# Patient Record
Sex: MALE | Race: WHITE | Employment: OTHER | URBAN - NONMETROPOLITAN AREA
[De-identification: names, ages, dates, MRNs, and addresses within clinical notes are randomized per-mention and may not be internally consistent; named-entity substitution may affect disease eponyms.]

---

## 2018-02-13 ENCOUNTER — DOCUMENTATION ONLY (OUTPATIENT)
Dept: FAMILY MEDICINE | Facility: OTHER | Age: 58
End: 2018-02-13

## 2019-07-22 ENCOUNTER — OFFICE VISIT (OUTPATIENT)
Dept: FAMILY MEDICINE | Facility: OTHER | Age: 59
End: 2019-07-22
Attending: FAMILY MEDICINE
Payer: COMMERCIAL

## 2019-07-22 VITALS
TEMPERATURE: 97.4 F | HEIGHT: 71 IN | DIASTOLIC BLOOD PRESSURE: 56 MMHG | SYSTOLIC BLOOD PRESSURE: 114 MMHG | RESPIRATION RATE: 20 BRPM | HEART RATE: 56 BPM | WEIGHT: 173 LBS | BODY MASS INDEX: 24.22 KG/M2

## 2019-07-22 DIAGNOSIS — R35.0 BENIGN PROSTATIC HYPERPLASIA WITH URINARY FREQUENCY: ICD-10-CM

## 2019-07-22 DIAGNOSIS — Z00.00 HEALTH CARE MAINTENANCE: Primary | ICD-10-CM

## 2019-07-22 DIAGNOSIS — N40.1 BENIGN PROSTATIC HYPERPLASIA WITH URINARY FREQUENCY: ICD-10-CM

## 2019-07-22 DIAGNOSIS — E78.00 HYPERCHOLESTEREMIA: ICD-10-CM

## 2019-07-22 DIAGNOSIS — Z98.890 H/O COLONOSCOPY: ICD-10-CM

## 2019-07-22 LAB
ALBUMIN SERPL-MCNC: 4.1 G/DL (ref 3.5–5.7)
ALP SERPL-CCNC: 73 U/L (ref 34–104)
ALT SERPL W P-5'-P-CCNC: 17 U/L (ref 7–52)
ANION GAP SERPL CALCULATED.3IONS-SCNC: 4 MMOL/L (ref 3–14)
AST SERPL W P-5'-P-CCNC: 15 U/L (ref 13–39)
BILIRUB SERPL-MCNC: 0.4 MG/DL (ref 0.3–1)
BUN SERPL-MCNC: 12 MG/DL (ref 7–25)
CALCIUM SERPL-MCNC: 9.4 MG/DL (ref 8.6–10.3)
CHLORIDE SERPL-SCNC: 104 MMOL/L (ref 98–107)
CHOLEST SERPL-MCNC: 213 MG/DL
CO2 SERPL-SCNC: 27 MMOL/L (ref 21–31)
CREAT SERPL-MCNC: 0.85 MG/DL (ref 0.7–1.3)
GFR SERPL CREATININE-BSD FRML MDRD: >90 ML/MIN/{1.73_M2}
GLUCOSE SERPL-MCNC: 107 MG/DL (ref 70–105)
HDLC SERPL-MCNC: 30 MG/DL (ref 23–92)
LDLC SERPL CALC-MCNC: 144 MG/DL
NONHDLC SERPL-MCNC: 183 MG/DL
POTASSIUM SERPL-SCNC: 4.2 MMOL/L (ref 3.5–5.1)
PROT SERPL-MCNC: 7.2 G/DL (ref 6.4–8.9)
PSA SERPL-ACNC: 0.83 NG/ML
SODIUM SERPL-SCNC: 135 MMOL/L (ref 134–144)
TRIGL SERPL-MCNC: 196 MG/DL

## 2019-07-22 PROCEDURE — 99396 PREV VISIT EST AGE 40-64: CPT | Performed by: FAMILY MEDICINE

## 2019-07-22 PROCEDURE — G0103 PSA SCREENING: HCPCS | Mod: ZL | Performed by: FAMILY MEDICINE

## 2019-07-22 PROCEDURE — 84153 ASSAY OF PSA TOTAL: CPT | Performed by: FAMILY MEDICINE

## 2019-07-22 PROCEDURE — 80061 LIPID PANEL: CPT | Mod: ZL | Performed by: FAMILY MEDICINE

## 2019-07-22 PROCEDURE — 80053 COMPREHEN METABOLIC PANEL: CPT | Mod: ZL | Performed by: FAMILY MEDICINE

## 2019-07-22 PROCEDURE — 36415 COLL VENOUS BLD VENIPUNCTURE: CPT | Mod: ZL | Performed by: FAMILY MEDICINE

## 2019-07-22 ASSESSMENT — ANXIETY QUESTIONNAIRES
6. BECOMING EASILY ANNOYED OR IRRITABLE: NOT AT ALL
3. WORRYING TOO MUCH ABOUT DIFFERENT THINGS: NOT AT ALL
1. FEELING NERVOUS, ANXIOUS, OR ON EDGE: NOT AT ALL
2. NOT BEING ABLE TO STOP OR CONTROL WORRYING: NOT AT ALL
7. FEELING AFRAID AS IF SOMETHING AWFUL MIGHT HAPPEN: NOT AT ALL
5. BEING SO RESTLESS THAT IT IS HARD TO SIT STILL: NOT AT ALL
GAD7 TOTAL SCORE: 0

## 2019-07-22 ASSESSMENT — PATIENT HEALTH QUESTIONNAIRE - PHQ9
5. POOR APPETITE OR OVEREATING: NOT AT ALL
SUM OF ALL RESPONSES TO PHQ QUESTIONS 1-9: 0

## 2019-07-22 ASSESSMENT — PAIN SCALES - GENERAL: PAINLEVEL: NO PAIN (0)

## 2019-07-22 ASSESSMENT — MIFFLIN-ST. JEOR: SCORE: 1621.85

## 2019-07-22 NOTE — LETTER
July 24, 2019      Howard Chow  [address] [facility]  [facility] [address]        Dear ,    We are writing to inform you of your test results.    The 10-year ASCVD risk score (Rafael ALVA JrMiguel A, et al., 2013) is: 10.1%    Values used to calculate the score:      Age: 58 years      Sex: Male      Is Non- : No      Diabetic: No      Tobacco smoker: No      Systolic Blood Pressure: 114 mmHg      Is BP treated: No      HDL Cholesterol: 30 mg/dL      Total Cholesterol: 213 mg/dL  As you can see your cholesterol is elevated and your coronary vascular risk score was 10% which indicates she will have a 10% chance of a having a heart attack in 10 years.  The current recommendation is anything above 7.5% we start her on a cholesterol-lowering medication.  I have included one for you to start.    Resulted Orders   Lipid Panel   Result Value Ref Range    Cholesterol 213 (H) <200 mg/dL    Triglycerides 196 (H) <150 mg/dL      Comment:      Borderline high:  150-199 mg/dl  High:             200-499 mg/dl  Very high:       >499 mg/dl      HDL Cholesterol 30 23 - 92 mg/dL    LDL Cholesterol Calculated 144 (H) <100 mg/dL      Comment:      Above desirable:  100-129 mg/dl  Borderline High:  130-159 mg/dL  High:             160-189 mg/dL  Very high:       >189 mg/dl      Non HDL Cholesterol 183 (H) <130 mg/dL      Comment:      Above Desirable:  130-159 mg/dl  Borderline high:  160-189 mg/dl  High:             190-219 mg/dl  Very high:       >219 mg/dl     PSA Screen GH   Result Value Ref Range    PSA Screen 0.830 <3.100 ng/mL      Comment:      The DXI Access PSAS WHO assay is a two site immunoenzymatic assay. Assay   values obtained with different assay methods cannot be used interchangeably   due to differences in assay methods and reagent specificity.     Comprehensive Metabolic Panel   Result Value Ref Range    Sodium 135 134 - 144 mmol/L    Potassium 4.2 3.5 - 5.1 mmol/L    Chloride 104 98 -  107 mmol/L    Carbon Dioxide 27 21 - 31 mmol/L    Anion Gap 4 3 - 14 mmol/L    Glucose 107 (H) 70 - 105 mg/dL    Urea Nitrogen 12 7 - 25 mg/dL    Creatinine 0.85 0.70 - 1.30 mg/dL    GFR Estimate >90 >60 mL/min/[1.73_m2]    GFR Estimate If Black >90 >60 mL/min/[1.73_m2]    Calcium 9.4 8.6 - 10.3 mg/dL    Bilirubin Total 0.4 0.3 - 1.0 mg/dL    Albumin 4.1 3.5 - 5.7 g/dL    Protein Total 7.2 6.4 - 8.9 g/dL    Alkaline Phosphatase 73 34 - 104 U/L    ALT 17 7 - 52 U/L    AST 15 13 - 39 U/L       If you have any questions or concerns, please call the clinic at the number listed above.       Sincerely,        Howard Roberts MD

## 2019-07-22 NOTE — NURSING NOTE
Patient here for yearly physical, no concerns today. Last eye exam 2018 and last dental exam June 2019. Medication Reconciliation: complete.    Leah Bejarano LPN  7/22/2019 9:11 AM

## 2019-07-23 ASSESSMENT — ANXIETY QUESTIONNAIRES: GAD7 TOTAL SCORE: 0

## 2019-07-24 RX ORDER — ATORVASTATIN CALCIUM 20 MG/1
20 TABLET, FILM COATED ORAL DAILY
Qty: 90 TABLET | Refills: 3 | Status: SHIPPED | OUTPATIENT
Start: 2019-07-24 | End: 2020-04-15

## 2019-07-24 RX ORDER — ATORVASTATIN CALCIUM 20 MG/1
20 TABLET, FILM COATED ORAL DAILY
Qty: 90 TABLET | Refills: 3 | Status: SHIPPED | OUTPATIENT
Start: 2019-07-24 | End: 2019-07-24

## 2019-07-24 NOTE — PROGRESS NOTES
"  SUBJECTIVE:   Howard Chow is a 58 year old male who presents to clinic today for the following health issues: Physical patient arrives here for physical.  He currently has no specific concerns or complaints.    Review of the chart shows he is in need of a shingles vaccine which was discussed with him        Patient Active Problem List    Diagnosis Date Noted     H/O colonoscopy nl 2017 07/22/2019     Priority: Medium     Benign prostatic hyperplasia with urinary frequency 07/22/2019     Priority: Medium     Benign non-nodular prostatic hyperplasia with lower urinary tract symptoms 09/19/2016     Priority: Medium     Diverticulosis of colon 02/23/2015     Priority: Medium     Health care maintenance 02/18/2015     Priority: Medium     Past Medical History:   Diagnosis Date     Other chest pain     Hx of chest wall pain      Past Surgical History:   Procedure Laterality Date     ARTHROSCOPY SHOULDER      bilat     COLONOSCOPY  02/23/2015    Normal, F/U 2025     OTHER SURGICAL HISTORY      2/2007,,HERNIA REPAIR,Left,Repair of left inguinal hernia with mesh carried out February 2007     Family History   Problem Relation Age of Onset     Genetic Disorder Other         Genetic,None noted     No Known Allergies    Review of Systems     OBJECTIVE:     /56   Pulse 56   Temp 97.4  F (36.3  C)   Resp 20   Ht 1.803 m (5' 11\")   Wt 78.5 kg (173 lb)   BMI 24.13 kg/m    Body mass index is 24.13 kg/m .  Physical Exam   Constitutional: He appears well-developed and well-nourished.   HENT:   Right Ear: External ear normal.   Left Ear: External ear normal.   Mouth/Throat: Oropharynx is clear and moist.   Eyes: Pupils are equal, round, and reactive to light. Conjunctivae are normal.   Cardiovascular: Normal rate and regular rhythm.   Pulmonary/Chest: Effort normal and breath sounds normal.   Abdominal: Soft. Bowel sounds are normal.   Musculoskeletal: Normal range of motion.   Neurological: He is alert. "   Skin: Skin is warm.       Diagnostic Test Results:  Results for orders placed or performed in visit on 07/22/19   Lipid Panel   Result Value Ref Range    Cholesterol 213 (H) <200 mg/dL    Triglycerides 196 (H) <150 mg/dL    HDL Cholesterol 30 23 - 92 mg/dL    LDL Cholesterol Calculated 144 (H) <100 mg/dL    Non HDL Cholesterol 183 (H) <130 mg/dL   PSA Screen GH   Result Value Ref Range    PSA Screen 0.830 <3.100 ng/mL   Comprehensive Metabolic Panel   Result Value Ref Range    Sodium 135 134 - 144 mmol/L    Potassium 4.2 3.5 - 5.1 mmol/L    Chloride 104 98 - 107 mmol/L    Carbon Dioxide 27 21 - 31 mmol/L    Anion Gap 4 3 - 14 mmol/L    Glucose 107 (H) 70 - 105 mg/dL    Urea Nitrogen 12 7 - 25 mg/dL    Creatinine 0.85 0.70 - 1.30 mg/dL    GFR Estimate >90 >60 mL/min/[1.73_m2]    GFR Estimate If Black >90 >60 mL/min/[1.73_m2]    Calcium 9.4 8.6 - 10.3 mg/dL    Bilirubin Total 0.4 0.3 - 1.0 mg/dL    Albumin 4.1 3.5 - 5.7 g/dL    Protein Total 7.2 6.4 - 8.9 g/dL    Alkaline Phosphatase 73 34 - 104 U/L    ALT 17 7 - 52 U/L    AST 15 13 - 39 U/L       ASSESSMENT/PLAN:         1. Health care maintenance  Satisfactory up-to-date  - Comprehensive Metabolic Panel; Future  - PSA Screen GH; Future  - Lipid Panel; Future  - Lipid Panel  - PSA Screen GH  - Comprehensive Metabolic Panel    2. H/O colonoscopy nl 2015  Up-to-date    3. Benign prostatic hyperplasia with urinary frequency          Howard Roberts MD  [facility]

## 2020-04-06 ENCOUNTER — VIRTUAL VISIT (OUTPATIENT)
Dept: INTERNAL MEDICINE | Facility: OTHER | Age: 60
End: 2020-04-06
Attending: INTERNAL MEDICINE
Payer: COMMERCIAL

## 2020-04-06 DIAGNOSIS — R07.89 CHEST HEAVINESS: ICD-10-CM

## 2020-04-06 DIAGNOSIS — R00.0 TACHYCARDIA: Primary | ICD-10-CM

## 2020-04-06 PROCEDURE — 99214 OFFICE O/P EST MOD 30 MIN: CPT | Mod: TEL | Performed by: INTERNAL MEDICINE

## 2020-04-06 NOTE — NURSING NOTE
"Chief Complaint   Patient presents with     RECHECK     Rapid pulse       Reason for phone visit: Patient states that he is having some rapid pulse at times and some shortness of breath.    Initial There were no vitals taken for this visit. Estimated body mass index is 24.13 kg/m  as calculated from the following:    Height as of 7/22/19: 1.803 m (5' 11\").    Weight as of 7/22/19: 78.5 kg (173 lb).  Medication Reconciliation: complete    DAVE, FLINCK, LPN  "

## 2020-04-06 NOTE — PROGRESS NOTES
"Subjective     Howard Chow is a 59 year old male who is being evaluated via a billable telephone visit.      The patient has been notified of following:     \"This telephone visit will be conducted via a call between you and your physician/provider. We have found that certain health care needs can be provided without the need for a physical exam.  This service lets us provide the care you need with a short phone conversation.  If a prescription is necessary we can send it directly to your pharmacy.  If lab work is needed we can place an order for that and you can then stop by our lab to have the test done at a later time.    If during the course of the call the physician/provider feels a telephone visit is not appropriate, you will not be charged for this service.\"     Patient has given verbal consent for Telephone visit?  Yes    Howard Chow complains of   Chief Complaint   Patient presents with     RECHECK     Rapid pulse       ALLERGIES  Patient has no known allergies.      Reviewed and updated as needed this visit by Provider  Tobacco  Allergies  Meds  Problems  Med Hx  Surg Hx  Fam Hx  Soc Hx          Additional information:    He reports that he has been having issues with increased heart rate.  He has had this off and on over the last year.  Typically it only lasts for a couple hours however in the last 3 months he has had an increase in these.    A few months ago in January he had a pulse of 170 which lasted for 24 hours.  Then again 2 weeks ago it occurred and was running in the 160-1 80 range for about 20 hours.  During these episodes he gets sweaty and lightheaded.  He feels that it is a regular rhythm when it occurs.    He also feels that at times he has been having difficulty getting deep breaths and feels short of breath at times.    No history of heart issues or lung issues.  He is a non-smoker.  He denies any significant alcohol or drug use.  No chest pain but does have some chest " heaviness with rest and activity at times.          Assessment/Plan:  1. Tachycardia  -We discussed options at this time.  I suspect he is having SVT versus atrial fibrillation.  Given the worsening symptoms and his age I do think he would benefit from further evaluation.  He is open to coming to the clinic for further testing.  He will be set up with appointments for lab, EKG and imaging.  He was encouraged to come in through the ER if he has recurrent episodes.   - Troponin GH; Future  - Comprehensive metabolic panel; Future  - CBC W PLT No Diff; Future  - TSH Reflex GH; Future  - EKG 12-lead, tracing only  - XR Chest 2 Views; Future    2. Chest heaviness  He is encouraged to start a baby aspirin.  Additional testing as below.  We may need to consider stress testing if indicated.  If he has worsening he was encouraged to come into the ER.  - Troponin GH; Future  - Comprehensive metabolic panel; Future  - CBC W PLT No Diff; Future  - TSH Reflex GH; Future  - EKG 12-lead, tracing only  - XR Chest 2 Views; Future      Phone call duration:  21 minutes    Melissa Huffman, DO

## 2020-04-08 ENCOUNTER — ALLIED HEALTH/NURSE VISIT (OUTPATIENT)
Dept: FAMILY MEDICINE | Facility: OTHER | Age: 60
End: 2020-04-08
Attending: INTERNAL MEDICINE
Payer: COMMERCIAL

## 2020-04-08 ENCOUNTER — HOSPITAL ENCOUNTER (OUTPATIENT)
Dept: GENERAL RADIOLOGY | Facility: OTHER | Age: 60
End: 2020-04-08
Attending: INTERNAL MEDICINE
Payer: COMMERCIAL

## 2020-04-08 DIAGNOSIS — R07.89 CHEST HEAVINESS: ICD-10-CM

## 2020-04-08 DIAGNOSIS — R00.0 TACHYCARDIA: ICD-10-CM

## 2020-04-08 LAB
ALBUMIN SERPL-MCNC: 4.1 G/DL (ref 3.5–5.7)
ALP SERPL-CCNC: 90 U/L (ref 34–104)
ALT SERPL W P-5'-P-CCNC: 16 U/L (ref 7–52)
ANION GAP SERPL CALCULATED.3IONS-SCNC: 6 MMOL/L (ref 3–14)
AST SERPL W P-5'-P-CCNC: 13 U/L (ref 13–39)
BILIRUB SERPL-MCNC: 0.4 MG/DL (ref 0.3–1)
BUN SERPL-MCNC: 12 MG/DL (ref 7–25)
CALCIUM SERPL-MCNC: 9.3 MG/DL (ref 8.6–10.3)
CHLORIDE SERPL-SCNC: 104 MMOL/L (ref 98–107)
CO2 SERPL-SCNC: 27 MMOL/L (ref 21–31)
CREAT SERPL-MCNC: 0.96 MG/DL (ref 0.7–1.3)
ERYTHROCYTE [DISTWIDTH] IN BLOOD BY AUTOMATED COUNT: 12.7 % (ref 10–15)
GFR SERPL CREATININE-BSD FRML MDRD: 80 ML/MIN/{1.73_M2}
GLUCOSE SERPL-MCNC: 102 MG/DL (ref 70–105)
HCT VFR BLD AUTO: 46.3 % (ref 40–53)
HGB BLD-MCNC: 14.9 G/DL (ref 13.3–17.7)
MCH RBC QN AUTO: 27.4 PG (ref 26.5–33)
MCHC RBC AUTO-ENTMCNC: 32.2 G/DL (ref 31.5–36.5)
MCV RBC AUTO: 85 FL (ref 78–100)
PLATELET # BLD AUTO: 249 10E9/L (ref 150–450)
POTASSIUM SERPL-SCNC: 4.2 MMOL/L (ref 3.5–5.1)
PROT SERPL-MCNC: 7.4 G/DL (ref 6.4–8.9)
RBC # BLD AUTO: 5.44 10E12/L (ref 4.4–5.9)
SODIUM SERPL-SCNC: 137 MMOL/L (ref 134–144)
TROPONIN I SERPL-MCNC: 2.8 PG/ML
TSH SERPL DL<=0.05 MIU/L-ACNC: 3.55 IU/ML (ref 0.34–5.6)
WBC # BLD AUTO: 6.9 10E9/L (ref 4–11)

## 2020-04-08 PROCEDURE — 71046 X-RAY EXAM CHEST 2 VIEWS: CPT

## 2020-04-08 PROCEDURE — 84484 ASSAY OF TROPONIN QUANT: CPT | Mod: ZL | Performed by: INTERNAL MEDICINE

## 2020-04-08 PROCEDURE — 93000 ELECTROCARDIOGRAM COMPLETE: CPT | Performed by: INTERNAL MEDICINE

## 2020-04-08 PROCEDURE — 84443 ASSAY THYROID STIM HORMONE: CPT | Mod: ZL | Performed by: INTERNAL MEDICINE

## 2020-04-08 PROCEDURE — 36415 COLL VENOUS BLD VENIPUNCTURE: CPT | Mod: ZL | Performed by: INTERNAL MEDICINE

## 2020-04-08 PROCEDURE — 80053 COMPREHEN METABOLIC PANEL: CPT | Mod: ZL | Performed by: INTERNAL MEDICINE

## 2020-04-08 PROCEDURE — 85027 COMPLETE CBC AUTOMATED: CPT | Mod: ZL | Performed by: INTERNAL MEDICINE

## 2020-04-08 NOTE — PROGRESS NOTES
Verified patient's name and . Patient stated reason for visit today is to have EKG completed. Stated having a recent virtual visit with Dr. Huffman, who ordered him to come in for an EKG. Unable to release previously placed EKG order by [facility]. New EKG order placed. EKG completed and transferred to [facility] EKG reading pool. Patient left clinic room ambulatory.       Amarilis NULL, RN on 2020 at 10:54 AM

## 2020-04-09 ENCOUNTER — TELEPHONE (OUTPATIENT)
Dept: FAMILY MEDICINE | Facility: OTHER | Age: 60
End: 2020-04-09

## 2020-04-09 DIAGNOSIS — I45.10 RIGHT BUNDLE BRANCH BLOCK: ICD-10-CM

## 2020-04-09 DIAGNOSIS — R00.0 TACHYCARDIA: ICD-10-CM

## 2020-04-09 DIAGNOSIS — R07.89 CHEST HEAVINESS: Primary | ICD-10-CM

## 2020-04-09 NOTE — TELEPHONE ENCOUNTER
Spoke to patient regarding his results from yesterday (4/8/2020). Patient states that he would like a referral to Dr. Salinas.     Cintia Aguayo LPN on 4/9/2020 at 1:39 PM

## 2020-04-13 LAB — INTERPRETATION ECG - MUSE: NORMAL

## 2020-04-15 ENCOUNTER — VIRTUAL VISIT (OUTPATIENT)
Dept: CARDIOLOGY | Facility: OTHER | Age: 60
End: 2020-04-15
Attending: INTERNAL MEDICINE
Payer: COMMERCIAL

## 2020-04-15 DIAGNOSIS — R00.0 TACHYCARDIA: Primary | ICD-10-CM

## 2020-04-15 DIAGNOSIS — R00.2 PALPITATIONS: ICD-10-CM

## 2020-04-15 DIAGNOSIS — I45.10 RIGHT BUNDLE BRANCH BLOCK: ICD-10-CM

## 2020-04-15 DIAGNOSIS — E78.2 MIXED HYPERLIPIDEMIA: ICD-10-CM

## 2020-04-15 PROCEDURE — 99204 OFFICE O/P NEW MOD 45 MIN: CPT | Mod: GT | Performed by: NURSE PRACTITIONER

## 2020-04-15 RX ORDER — ASPIRIN 81 MG/1
81 TABLET ORAL DAILY
COMMUNITY
End: 2024-09-16

## 2020-04-15 RX ORDER — ATORVASTATIN CALCIUM 20 MG/1
20 TABLET, FILM COATED ORAL DAILY
Qty: 90 TABLET | Refills: 3 | Status: SHIPPED | OUTPATIENT
Start: 2020-04-15 | End: 2021-04-29

## 2020-04-15 ASSESSMENT — ANXIETY QUESTIONNAIRES
5. BEING SO RESTLESS THAT IT IS HARD TO SIT STILL: NOT AT ALL
6. BECOMING EASILY ANNOYED OR IRRITABLE: NOT AT ALL
1. FEELING NERVOUS, ANXIOUS, OR ON EDGE: NOT AT ALL
2. NOT BEING ABLE TO STOP OR CONTROL WORRYING: NOT AT ALL
7. FEELING AFRAID AS IF SOMETHING AWFUL MIGHT HAPPEN: NOT AT ALL
GAD7 TOTAL SCORE: 0
3. WORRYING TOO MUCH ABOUT DIFFERENT THINGS: NOT AT ALL
IF YOU CHECKED OFF ANY PROBLEMS ON THIS QUESTIONNAIRE, HOW DIFFICULT HAVE THESE PROBLEMS MADE IT FOR YOU TO DO YOUR WORK, TAKE CARE OF THINGS AT HOME, OR GET ALONG WITH OTHER PEOPLE: NOT DIFFICULT AT ALL

## 2020-04-15 ASSESSMENT — PATIENT HEALTH QUESTIONNAIRE - PHQ9: 5. POOR APPETITE OR OVEREATING: NOT AT ALL

## 2020-04-15 NOTE — PROGRESS NOTES
"Howard Chow is a 59 year old male who is being evaluated via a billable video visit.      Patient states that in January and March he had a pulse of over 170 for over a 20 hour period.        The patient has been notified of following:     \"This video visit will be conducted via a call between you and your physician/provider. We have found that certain health care needs can be provided without the need for an in-person physical exam.  This service lets us provide the care you need with a video conversation.  If a prescription is necessary we can send it directly to your pharmacy.  If lab work is needed we can place an order for that and you can then stop by our lab to have the test done at a later time.    Video visits are billed at different rates depending on your insurance coverage.  Please reach out to your insurance provider with any questions.    If during the course of the call the physician/provider feels a video visit is not appropriate, you will not be charged for this service.\"    Patient has given verbal consent for Video visit? Yes    How would you like to obtain your AVS? Mail a copy    Patient would like the video invitation sent by: Send to e-mail at: [email or web address]      Video Start Time: 1:41 PM    Additional provider notes:   [facility]   CARDIOLOGY CONSULT     HPI:   Mr. Chow is a 59 year old male who presents via virtual video consult with reported episodes of tachycardia, chest heaviness, dyspnea, lightheadedness and recent EKG with RBBB.     Patient has a history of mixed HLD, no personal past cardiac history. No history of tobacco abuse. No family history of heart disease. No history of drug or stimulant use. Currently drinking 3-4 cans of pepsi daily, no coffee. Rare ETOH use.     He was evaluated over telephone visit on April 6th for these symptoms. His labs have been reviewed today with normal thyroid function, stable electrolytes and no anemia. There was no " elevation in his troponin and no ischemic EKG change with no changes in ST- T segments. He was noted to have a new RBBB pattern present. It was suspected episodes may be related to SVT vs AF and he was referred to cardiology.     Today patient reports that he has noted two episodes of prolonged tachycardia, the first episode occurred in January with 's for 24 hours. He had an additional episode about 2-3 weeks ago with -180's for 20 hours. Admits that he feels chest tightness with these episodes along with dyspnea, lightheadedness and diaphoretic at times. He reports that he has had similar rapid palpitations in the past only lasting about one hour, this was initially noted in 2015 with normal EKG, no further follow-up on this at that time. Independent of these episodes, he denies any chest pain or pressure. No pain in the jaw, arm, neck or back. No exertional chest discomfort. No shortness of breath with exertion. Sometimes feels that he can not get a deep breath in, denies feel short of breath or needing to catch his breath. No other symptoms of lightheadedness or syncope. No edema.       PAST MEDICAL HISTORY:   Past Medical History:   Diagnosis Date     Other chest pain     Hx of chest wall pain          FAMILY HISTORY:   Family History   Problem Relation Age of Onset     Genetic Disorder Other         Genetic,None noted          PAST SURGICAL HISTORY:   Past Surgical History:   Procedure Laterality Date     ARTHROSCOPY SHOULDER      bilat     COLONOSCOPY  02/23/2015    Normal, F/U 2025     OTHER SURGICAL HISTORY      2/2007,,HERNIA REPAIR,Left,Repair of left inguinal hernia with mesh carried out February 2007          SOCIAL HISTORY:   Social History     Socioeconomic History     Marital status:      Spouse name: None     Number of children: None     Years of education: None     Highest education level: None   Occupational History     None   Social Needs     Financial resource strain:  None     Food insecurity     Worry: None     Inability: None     Transportation needs     Medical: None     Non-medical: None   Tobacco Use     Smoking status: Never Smoker     Smokeless tobacco: Never Used   Substance and Sexual Activity     Alcohol use: Yes     Alcohol/week: 1.0 standard drinks     Comment: 1x  a week      Drug use: Never     Comment: Drug use: No     Sexual activity: Yes     Partners: Female   Lifestyle     Physical activity     Days per week: None     Minutes per session: None     Stress: None   Relationships     Social connections     Talks on phone: None     Gets together: None     Attends [employer] service: None     Active member of club or organization: None     Attends meetings of clubs or organizations: None     Relationship status: None     Intimate partner violence     Fear of current or ex partner: None     Emotionally abused: None     Physically abused: None     Forced sexual activity: None   Other Topics Concern     None   Social History Narrative         3 step children  [employer]    Patient has never smoked.     Alcohol Use - yes little beer          CURRENT MEDICATIONS:   Prior to Admission medications    Medication Sig Start Date End Date Taking? Authorizing Provider   aspirin 81 MG EC tablet Take 81 mg by mouth daily   Yes Reported, Patient   atorvastatin (LIPITOR) 20 MG tablet Take 1 tablet (20 mg) by mouth daily 7/24/19  Yes Howard Roberts MD          ALLERGIES:   No Known Allergies       ROS:   CONSTITUTIONAL: No reported fever or chills. No changes in weight.  ENT: No visual disturbance, ear ache, epistaxis or sore throat.   CARDIOVASCULAR: Positive for palpitations, fast pounding with associated chest tightness. No other reported chest pain and no lower extremity edema.   RESPIRATORY: No dyspnea upon exertion, cough, wheezing or hemoptysis.   GI: No reported abdominal pain.   : No reported hematuria or dysuria  NEUROLOGICAL: Positive for  lightheadedness only with episodes of prolonged tachycardia. No dizziness, syncope, ataxia, paresthesias or weakness.   HEMATOLOGIC: No history of anemia. No bleeding or excessive bruising. No history of blood clots.   MUSCULOSKELETAL: No new joint pain or swelling, no muscle pain.  ENDOCRINOLOGIC: No temperature intolerance. No hair or skin changes.  SKIN: No abnormal rashes or sores, no unusual itching.  PSYCHIATRIC: No history of depression or anxiety. No changes in mood, feeling down or anxious. No changes in sleep.      PHYSICAL EXAM:   There were no vitals taken for this visit. 114/56, HR 56  GENERAL: The patient is a well-developed, well-nourished, in no apparent distress.  HEENT: Head is normocephalic and atraumatic. Eyes are symmetrical with normal visual tracking. Nares appeared normal without nasal drainage. Mucous membranes are moist, no cyanosis.  NECK: Supple.  CHEST/ LUNGS: Normal respirations unlabored and normal respiratory rate.   ABD: Abdomen appears nondistended. .   NEUROLOGIC: Alert and oriented X3. Normal speech and affect. No visible focal neurologic deficits.   SKIN: No visible jaundice, rashes or visible skin lesions present.     EKG:    Reviewed EKG from 4/8/2020 with NSR, RBBB, HR 68 bpm.    LAB RESULTS:   [facility]/Nurse Visit on 04/08/2020   Component Date Value Ref Range Status     Interpretation ECG 04/08/2020 Click View Image link to view waveform and result   Final   Orders Only on 04/08/2020   Component Date Value Ref Range Status     TSH Reflex 04/08/2020 3.55  0.34 - 5.60 IU/mL Final     WBC 04/08/2020 6.9  4.0 - 11.0 10e9/L Final     RBC Count 04/08/2020 5.44  4.4 - 5.9 10e12/L Final     Hemoglobin 04/08/2020 14.9  13.3 - 17.7 g/dL Final     Hematocrit 04/08/2020 46.3  40.0 - 53.0 % Final     MCV 04/08/2020 85  78 - 100 fl Final     MCH 04/08/2020 27.4  26.5 - 33.0 pg Final     MCHC 04/08/2020 32.2  31.5 - 36.5 g/dL Final     RDW 04/08/2020 12.7  10.0 - 15.0 % Final      Platelet Count 04/08/2020 249  150 - 450 10e9/L Final     Sodium 04/08/2020 137  134 - 144 mmol/L Final     Potassium 04/08/2020 4.2  3.5 - 5.1 mmol/L Final     Chloride 04/08/2020 104  98 - 107 mmol/L Final     Carbon Dioxide 04/08/2020 27  21 - 31 mmol/L Final     Anion Gap 04/08/2020 6  3 - 14 mmol/L Final     Glucose 04/08/2020 102  70 - 105 mg/dL Final     Urea Nitrogen 04/08/2020 12  7 - 25 mg/dL Final     Creatinine 04/08/2020 0.96  0.70 - 1.30 mg/dL Final     GFR Estimate 04/08/2020 80  >60 mL/min/[1.73_m2] Final     GFR Estimate If Black 04/08/2020 >90  >60 mL/min/[1.73_m2] Final     Calcium 04/08/2020 9.3  8.6 - 10.3 mg/dL Final     Bilirubin Total 04/08/2020 0.4  0.3 - 1.0 mg/dL Final     Albumin 04/08/2020 4.1  3.5 - 5.7 g/dL Final     Protein Total 04/08/2020 7.4  6.4 - 8.9 g/dL Final     Alkaline Phosphatase 04/08/2020 90  34 - 104 U/L Final     ALT 04/08/2020 16  7 - 52 U/L Final     AST 04/08/2020 13  13 - 39 U/L Final     Troponin 04/08/2020 2.8  <34.0 pg/mL Final          ASSESSMENT:   Howard Chow presents for cardiology evaluation with prolonged episodes of tachycardia for which he has been very symptomatic. No anginal symptoms independent of these episodes.   Patient has a history of mixed HLD and newly noted RBBB. No past cardiac history. No history of tobacco abuse. No family history of heart disease.     PLAN:   1. Tachycardia  Patient with prolonged episodes of tachycardia rates up to 180 bpm lasting up to 24 hours. He has been very symptomatic with these episodes describing chest tightness, feeling short of breath, lightheaded and diaphoretic.   Notes his HR is regular and rapid with these episodes. First noted shorter episodes of this starting back in 2015. Suspected most likely SVT vs AF.  Consider cardiac monitoring however, patient is only experiencing once every 1-2 months. Given the episodes are prolonged, we will have patient come in for EKG when symptomatic to identify  underlying rhythm, we could also consider 4 week Biotel event monitor or implantable Loop recorder which would monitor patient for up to 3 years.   We did discuss medication suppression vs ablation for some arrhythmias such as SVT, we will start with identify the underlying rhythm that is source of his symptoms.   Labs reviewed with stable electrolytes and thyroid function. No anemia and no elevation in troponin.   He has not experienced any anginal symptoms independent of these tachycardic episodes and no exertional symptoms or activity intolerance. EKG with no ST-T changes. Low risk for CAD, we will plan for stress echo with doppler exam of valves in 1-2 months with COVID pandemic currently. Certainly sooner if he experiences any new or worsening symptoms.     2. Right bundle branch block  Stable with no Left bundle or AV block.  - CARDIOLOGY EVAL ADULT REFERRAL    3. Mixed hyperlipidemia  He will continue on Atorvastatin 20 mg every night.     - atorvastatin (LIPITOR) 20 MG tablet; Take 1 tablet (20 mg) by mouth daily  Dispense: 90 tablet; Refill: 3    4. Palpitations  See above.     Follow-up with cardiology in 6 weeks. Return to clinic or ED if recurrence of tachycardia.      Video-Visit Details    Type of service:  Video Visit    Video End Time (time video stopped): 2:22PM    >50% of visit spent counseling on above diagnoses and plan of care.     Originating Location (pt. Location): Home    Distant Location (provider location):  [facility]    Mode of Communication:  Video Conference via [employer]      MYRANDA Cruz  CHFN

## 2020-04-16 ASSESSMENT — ANXIETY QUESTIONNAIRES: GAD7 TOTAL SCORE: 0

## 2020-04-16 NOTE — PATIENT INSTRUCTIONS
1. Return for EKG with tachycardia episode. If clinic hours present to cardiology clinic for this. If after clinic hours present to ER.   2. Follow-up with cardiology via virtual video visit in 6 weeks.   3. Exercise stress echocardiogram to be scheduled in 4-8 weeks.

## 2020-04-27 ENCOUNTER — TELEPHONE (OUTPATIENT)
Dept: CARDIOLOGY | Facility: OTHER | Age: 60
End: 2020-04-27

## 2020-04-27 DIAGNOSIS — R07.89 CHEST PRESSURE: Primary | ICD-10-CM

## 2020-04-27 DIAGNOSIS — R00.2 PALPITATIONS: ICD-10-CM

## 2020-04-27 DIAGNOSIS — R06.09 DOE (DYSPNEA ON EXERTION): ICD-10-CM

## 2020-04-27 DIAGNOSIS — R00.0 TACHYCARDIA: ICD-10-CM

## 2020-04-27 NOTE — TELEPHONE ENCOUNTER
See note from MYRANDA South CNP  I spoke with patients health insurance and new order placed for stress echo with order number [id number], this order is valid from 5/4/2020-8/1/2020.   Thanks,   MYRANDA Cruz CNP                  I called [facility] on 4/27/20 at [phone] to try and get a PA for Echo Stress Echocardiogram [id number] but it did not get approved.  They would like the ordering provider to call ASAP and do a [id number] review at [phone].  I also will be faxing them clinicals to [phone].  Umm Moise on 4/27/2020 at 3:49 PM

## 2020-05-14 ENCOUNTER — TELEPHONE (OUTPATIENT)
Dept: CARDIOLOGY | Facility: OTHER | Age: 60
End: 2020-05-14

## 2020-05-18 ENCOUNTER — HOSPITAL ENCOUNTER (OUTPATIENT)
Dept: CARDIOLOGY | Facility: OTHER | Age: 60
Discharge: HOME | End: 2020-05-18
Attending: NURSE PRACTITIONER | Admitting: NURSE PRACTITIONER
Payer: COMMERCIAL

## 2020-05-18 DIAGNOSIS — R00.2 PALPITATIONS: ICD-10-CM

## 2020-05-18 DIAGNOSIS — R00.0 TACHYCARDIA: ICD-10-CM

## 2020-05-18 DIAGNOSIS — R07.89 CHEST PRESSURE: ICD-10-CM

## 2020-05-18 DIAGNOSIS — R06.09 DOE (DYSPNEA ON EXERTION): ICD-10-CM

## 2020-05-18 PROCEDURE — 93016 CV STRESS TEST SUPVJ ONLY: CPT | Performed by: INTERNAL MEDICINE

## 2020-05-18 PROCEDURE — 93350 STRESS TTE ONLY: CPT | Mod: 26 | Performed by: INTERNAL MEDICINE

## 2020-05-18 PROCEDURE — 93325 DOPPLER ECHO COLOR FLOW MAPG: CPT | Mod: 26 | Performed by: INTERNAL MEDICINE

## 2020-05-18 PROCEDURE — 25500064 ZZH RX 255 OP 636: Performed by: NURSE PRACTITIONER

## 2020-05-18 PROCEDURE — 93018 CV STRESS TEST I&R ONLY: CPT | Performed by: INTERNAL MEDICINE

## 2020-05-18 PROCEDURE — 40000264 ECHO STRESS ECHOCARDIOGRAM

## 2020-05-18 PROCEDURE — 93321 DOPPLER ECHO F-UP/LMTD STD: CPT | Mod: 26 | Performed by: INTERNAL MEDICINE

## 2020-05-18 RX ADMIN — PERFLUTREN 5 ML: 6.52 INJECTION, SUSPENSION INTRAVENOUS at 12:30

## 2020-05-18 NOTE — PROGRESS NOTES
12:00  The patient arrived for a stress echo.  The procedure, risks and benefits were discussed and the consent was signed.  The patient was prepped for the stress test, and an IV was placed per procedure.  The echo sonographer did the initial images with definity for image enhancement.    arrived, and the patient biked 8 minutes and 47 seconds.  The patient tolerated the procedure.  Stress images were completed and the IV was removed per procedure.  The patient was released in stable condition.  The patient was instructed that the ordering MD will call with results in one to two days.  Please see the chart for complete test results.

## 2020-12-27 ENCOUNTER — HEALTH MAINTENANCE LETTER (OUTPATIENT)
Age: 60
End: 2020-12-27

## 2021-04-29 DIAGNOSIS — E78.2 MIXED HYPERLIPIDEMIA: ICD-10-CM

## 2021-04-29 RX ORDER — ATORVASTATIN CALCIUM 20 MG/1
TABLET, FILM COATED ORAL
Qty: 30 TABLET | Refills: 0 | Status: SHIPPED | OUTPATIENT
Start: 2021-04-29 | End: 2021-06-01

## 2021-04-29 NOTE — TELEPHONE ENCOUNTER
Lipitor 20mg      Last Written Prescription Date:  4/15/20  Last Fill Quantity: 90,   # refills: 3  Last Office Visit: 4/15/20  Future Office visit:       Routing refill request to provider for review/approval because:

## 2021-06-01 DIAGNOSIS — E78.2 MIXED HYPERLIPIDEMIA: ICD-10-CM

## 2021-06-01 RX ORDER — ATORVASTATIN CALCIUM 20 MG/1
TABLET, FILM COATED ORAL
Qty: 30 TABLET | Refills: 0 | Status: SHIPPED | OUTPATIENT
Start: 2021-06-01 | End: 2021-06-17

## 2021-06-01 NOTE — TELEPHONE ENCOUNTER
"Pt.'s last office visit with MYRANDA South CNP was on 4/15/20 and note states:  \"we will plan for stress echo with doppler exam of valves in 1-2 months with COVID pandemic currently\"  No stress echo completed.    Routing refill request to provider for review/approval because:  No lab or appointment in the last year.  No upcoming appointment.    Unable to complete prescription refill per RN Medication Refill Policy.  To MD to address.  Norma Garcia RN ....................  6/1/2021   3:13 PM  "

## 2021-06-17 ENCOUNTER — TELEPHONE (OUTPATIENT)
Dept: FAMILY MEDICINE | Facility: OTHER | Age: 61
End: 2021-06-17

## 2021-06-17 DIAGNOSIS — E78.2 MIXED HYPERLIPIDEMIA: ICD-10-CM

## 2021-06-17 RX ORDER — ATORVASTATIN CALCIUM 20 MG/1
20 TABLET, FILM COATED ORAL DAILY
Qty: 90 TABLET | Refills: 1 | Status: SHIPPED | OUTPATIENT
Start: 2021-06-17 | End: 2021-08-24

## 2021-06-17 NOTE — TELEPHONE ENCOUNTER
Please call the patient.  He would like RX - Atorvastatin   Refilled.      Rajni Ness on 6/17/2021 at 12:06 PM

## 2021-06-21 NOTE — TELEPHONE ENCOUNTER
Patient notified of this and that he is overdue to see MYRANDA South CNP.  He says he was told to come in when he was having an episode but has only had one heart episode since and he was in [place] at the time.  Norma Garcia RN......June 21, 2021...11:22 AM

## 2021-08-24 ENCOUNTER — OFFICE VISIT (OUTPATIENT)
Dept: FAMILY MEDICINE | Facility: OTHER | Age: 61
End: 2021-08-24
Attending: FAMILY MEDICINE
Payer: COMMERCIAL

## 2021-08-24 VITALS
RESPIRATION RATE: 16 BRPM | WEIGHT: 181.8 LBS | OXYGEN SATURATION: 97 % | HEIGHT: 71 IN | DIASTOLIC BLOOD PRESSURE: 64 MMHG | BODY MASS INDEX: 25.45 KG/M2 | SYSTOLIC BLOOD PRESSURE: 120 MMHG | HEART RATE: 64 BPM | TEMPERATURE: 97.5 F

## 2021-08-24 DIAGNOSIS — Z02.89 HEALTH EXAMINATION OF DEFINED SUBPOPULATION: Primary | ICD-10-CM

## 2021-08-24 DIAGNOSIS — E78.2 MIXED HYPERLIPIDEMIA: ICD-10-CM

## 2021-08-24 LAB
ANION GAP SERPL CALCULATED.3IONS-SCNC: 2 MMOL/L (ref 3–14)
BUN SERPL-MCNC: 12 MG/DL (ref 7–25)
CALCIUM SERPL-MCNC: 9.9 MG/DL (ref 8.6–10.3)
CHLORIDE BLD-SCNC: 105 MMOL/L (ref 98–107)
CHOLEST SERPL-MCNC: 156 MG/DL
CO2 SERPL-SCNC: 30 MMOL/L (ref 21–31)
CREAT SERPL-MCNC: 0.97 MG/DL (ref 0.7–1.3)
FASTING STATUS PATIENT QL REPORTED: ABNORMAL
GFR SERPL CREATININE-BSD FRML MDRD: 84 ML/MIN/1.73M2
GLUCOSE BLD-MCNC: 95 MG/DL (ref 70–105)
HDLC SERPL-MCNC: 31 MG/DL (ref 23–92)
HOLD SPECIMEN: NORMAL
LDLC SERPL CALC-MCNC: 86 MG/DL
NONHDLC SERPL-MCNC: 125 MG/DL
POTASSIUM BLD-SCNC: 4.8 MMOL/L (ref 3.5–5.1)
PSA SERPL-MCNC: 0.71 UG/L (ref 0–4)
SODIUM SERPL-SCNC: 137 MMOL/L (ref 134–144)
TRIGL SERPL-MCNC: 193 MG/DL

## 2021-08-24 PROCEDURE — 83718 ASSAY OF LIPOPROTEIN: CPT | Mod: ZL | Performed by: FAMILY MEDICINE

## 2021-08-24 PROCEDURE — 80048 BASIC METABOLIC PNL TOTAL CA: CPT | Mod: ZL | Performed by: FAMILY MEDICINE

## 2021-08-24 PROCEDURE — 84153 ASSAY OF PSA TOTAL: CPT | Mod: ZL | Performed by: FAMILY MEDICINE

## 2021-08-24 PROCEDURE — 99396 PREV VISIT EST AGE 40-64: CPT | Performed by: FAMILY MEDICINE

## 2021-08-24 PROCEDURE — 36415 COLL VENOUS BLD VENIPUNCTURE: CPT | Mod: ZL | Performed by: FAMILY MEDICINE

## 2021-08-24 RX ORDER — ATORVASTATIN CALCIUM 20 MG/1
20 TABLET, FILM COATED ORAL DAILY
Qty: 90 TABLET | Refills: 4 | Status: SHIPPED | OUTPATIENT
Start: 2021-08-24 | End: 2022-08-31

## 2021-08-24 RX ORDER — ATORVASTATIN CALCIUM 20 MG/1
20 TABLET, FILM COATED ORAL DAILY
Qty: 90 TABLET | Refills: 1 | Status: SHIPPED | OUTPATIENT
Start: 2021-08-24 | End: 2021-08-24

## 2021-08-24 ASSESSMENT — PAIN SCALES - GENERAL: PAINLEVEL: NO PAIN (0)

## 2021-08-24 ASSESSMENT — ANXIETY QUESTIONNAIRES
GAD7 TOTAL SCORE: 0
5. BEING SO RESTLESS THAT IT IS HARD TO SIT STILL: NOT AT ALL
2. NOT BEING ABLE TO STOP OR CONTROL WORRYING: NOT AT ALL
6. BECOMING EASILY ANNOYED OR IRRITABLE: NOT AT ALL
3. WORRYING TOO MUCH ABOUT DIFFERENT THINGS: NOT AT ALL
1. FEELING NERVOUS, ANXIOUS, OR ON EDGE: NOT AT ALL
7. FEELING AFRAID AS IF SOMETHING AWFUL MIGHT HAPPEN: NOT AT ALL

## 2021-08-24 ASSESSMENT — PATIENT HEALTH QUESTIONNAIRE - PHQ9: 5. POOR APPETITE OR OVEREATING: NOT AT ALL

## 2021-08-24 ASSESSMENT — MIFFLIN-ST. JEOR: SCORE: 1651.77

## 2021-08-24 NOTE — NURSING NOTE
Patient here for yearly physical, last eye exam 2017 and his last dental exam 2019. No concerns today needing refill of medication. Medication Reconciliation: complete.    Leah Bejarano LPN  8/24/2021 9:12 AM

## 2021-08-24 NOTE — PROGRESS NOTES
"  SUBJECTIVE:   Howard Chow is a 60 year old male who presents to clinic today for the following health issues: Physical    Patient arrives here for physical and medication refill.  He is up-to-date on his colonoscopy immunizations.  He currently has no complaints or concerns.  He states his medication is working well.        Patient Active Problem List    Diagnosis Date Noted     H/O colonoscopy nl 2017 07/22/2019     Priority: Medium     Benign prostatic hyperplasia with urinary frequency 07/22/2019     Priority: Medium     Benign non-nodular prostatic hyperplasia with lower urinary tract symptoms 09/19/2016     Priority: Medium     Diverticulosis of colon 02/23/2015     Priority: Medium     Health care maintenance 02/18/2015     Priority: Medium     Past Medical History:   Diagnosis Date     Other chest pain     Hx of chest wall pain      Current Outpatient Medications   Medication Sig Dispense Refill     aspirin 81 MG EC tablet Take 81 mg by mouth daily       atorvastatin (LIPITOR) 20 MG tablet Take 1 tablet (20 mg) by mouth daily 90 tablet 4     No Known Allergies    Review of Systems     OBJECTIVE:     /64   Pulse 64   Temp 97.5  F (36.4  C)   Resp 16   Ht 1.803 m (5' 11\")   Wt 82.5 kg (181 lb 12.8 oz)   SpO2 97%   BMI 25.36 kg/m    Body mass index is 25.36 kg/m .  Physical Exam  Constitutional:       Appearance: Normal appearance.   HENT:      Head: Normocephalic.      Right Ear: Tympanic membrane normal.      Left Ear: Tympanic membrane normal.      Mouth/Throat:      Mouth: Mucous membranes are moist.   Cardiovascular:      Rate and Rhythm: Normal rate and regular rhythm.      Heart sounds: No murmur heard.     Pulmonary:      Effort: Pulmonary effort is normal.      Breath sounds: Normal breath sounds.   Abdominal:      General: Abdomen is flat.   Musculoskeletal:         General: Normal range of motion.   Skin:     General: Skin is warm.   Neurological:      Mental Status: He is alert. "   Psychiatric:         Mood and Affect: Mood normal.         Diagnostic Test Results:  Results for orders placed or performed in visit on 08/24/21   Lipid Panel     Status: Abnormal   Result Value Ref Range    Cholesterol 156 <200 mg/dL    Triglycerides 193 (H) <150 mg/dL    Direct Measure HDL 31 23 - 92 mg/dL    LDL Cholesterol Calculated 86 <=100 mg/dL    Non HDL Cholesterol 125 <130 mg/dL    Patient Fasting > 8hrs? Unknown    Basic Metabolic Panel     Status: Abnormal   Result Value Ref Range    Sodium 137 134 - 144 mmol/L    Potassium 4.8 3.5 - 5.1 mmol/L    Chloride 105 98 - 107 mmol/L    Carbon Dioxide (CO2) 30 21 - 31 mmol/L    Anion Gap 2 (L) 3 - 14 mmol/L    Urea Nitrogen 12 7 - 25 mg/dL    Creatinine 0.97 0.70 - 1.30 mg/dL    Calcium 9.9 8.6 - 10.3 mg/dL    Glucose 95 70 - 105 mg/dL    GFR Estimate 84 >60 mL/min/1.73m2   PSA Screen GH     Status: Normal   Result Value Ref Range    Prostate Specific Antigen Screen 0.71 0.00 - 4.00 ug/L   Extra Purple Top Tube     Status: None   Result Value Ref Range    Hold Specimen JIC    Extra Tube     Status: None    Narrative    The following orders were created for panel order Extra Tube.  Procedure                               Abnormality         Status                     ---------                               -----------         ------                     Extra Purple Top Tube[[id number]]                            Final result                 Please view results for these tests on the individual orders.       ASSESSMENT/PLAN:         (Z02.89) Health examination of defined subpopulation  (primary encounter diagnosis)  Comment: Satisfactory  Plan: PSA Screen GH            (E78.2) Mixed hyperlipidemia  Comment: Under excellent control  Plan: Lipid Panel, Basic Metabolic Panel,         atorvastatin (LIPITOR) 20 MG tablet,         DISCONTINUED: atorvastatin (LIPITOR) 20 MG         tablet                Howard Roberts MD  [facility]

## 2021-08-25 ASSESSMENT — ANXIETY QUESTIONNAIRES: GAD7 TOTAL SCORE: 0

## 2021-10-09 ENCOUNTER — HEALTH MAINTENANCE LETTER (OUTPATIENT)
Age: 61
End: 2021-10-09

## 2022-03-22 ENCOUNTER — TRANSFERRED RECORDS (OUTPATIENT)
Dept: HEALTH INFORMATION MANAGEMENT | Facility: OTHER | Age: 62
End: 2022-03-22
Payer: COMMERCIAL

## 2022-05-16 ENCOUNTER — TELEPHONE (OUTPATIENT)
Dept: FAMILY MEDICINE | Facility: OTHER | Age: 62
End: 2022-05-16
Payer: COMMERCIAL

## 2022-05-16 ENCOUNTER — HOSPITAL ENCOUNTER (EMERGENCY)
Facility: OTHER | Age: 62
Discharge: HOME | End: 2022-05-16
Attending: FAMILY MEDICINE | Admitting: FAMILY MEDICINE
Payer: COMMERCIAL

## 2022-05-16 ENCOUNTER — ALLIED HEALTH/NURSE VISIT (OUTPATIENT)
Dept: CARDIOLOGY | Facility: OTHER | Age: 62
End: 2022-05-16
Attending: FAMILY MEDICINE
Payer: COMMERCIAL

## 2022-05-16 ENCOUNTER — APPOINTMENT (OUTPATIENT)
Dept: GENERAL RADIOLOGY | Facility: OTHER | Age: 62
End: 2022-05-16
Attending: FAMILY MEDICINE
Payer: COMMERCIAL

## 2022-05-16 VITALS
WEIGHT: 177 LBS | BODY MASS INDEX: 24.78 KG/M2 | HEART RATE: 81 BPM | DIASTOLIC BLOOD PRESSURE: 76 MMHG | OXYGEN SATURATION: 98 % | RESPIRATION RATE: 7 BRPM | TEMPERATURE: 97.1 F | HEIGHT: 71 IN | SYSTOLIC BLOOD PRESSURE: 120 MMHG

## 2022-05-16 VITALS
BODY MASS INDEX: 24.77 KG/M2 | SYSTOLIC BLOOD PRESSURE: 98 MMHG | DIASTOLIC BLOOD PRESSURE: 74 MMHG | OXYGEN SATURATION: 98 % | HEART RATE: 175 BPM | RESPIRATION RATE: 16 BRPM | WEIGHT: 177.6 LBS | TEMPERATURE: 97 F

## 2022-05-16 DIAGNOSIS — R00.2 PALPITATIONS: Primary | ICD-10-CM

## 2022-05-16 DIAGNOSIS — I47.10 SVT (SUPRAVENTRICULAR TACHYCARDIA) (H): ICD-10-CM

## 2022-05-16 DIAGNOSIS — R00.0 TACHYCARDIA: ICD-10-CM

## 2022-05-16 LAB
ANION GAP SERPL CALCULATED.3IONS-SCNC: 8 MMOL/L (ref 3–14)
BASOPHILS # BLD AUTO: 0.1 10E3/UL (ref 0–0.2)
BASOPHILS NFR BLD AUTO: 1 %
BUN SERPL-MCNC: 15 MG/DL (ref 7–25)
CALCIUM SERPL-MCNC: 9.4 MG/DL (ref 8.6–10.3)
CHLORIDE BLD-SCNC: 107 MMOL/L (ref 98–107)
CO2 SERPL-SCNC: 24 MMOL/L (ref 21–31)
CREAT SERPL-MCNC: 0.94 MG/DL (ref 0.7–1.3)
D DIMER PPP FEU-MCNC: 0.62 UG/ML FEU (ref 0–0.5)
EOSINOPHIL # BLD AUTO: 0.3 10E3/UL (ref 0–0.7)
EOSINOPHIL NFR BLD AUTO: 3 %
ERYTHROCYTE [DISTWIDTH] IN BLOOD BY AUTOMATED COUNT: 13 % (ref 10–15)
GFR SERPL CREATININE-BSD FRML MDRD: >90 ML/MIN/1.73M2
GLUCOSE BLD-MCNC: 111 MG/DL (ref 70–105)
HCT VFR BLD AUTO: 44.6 % (ref 40–53)
HGB BLD-MCNC: 14.7 G/DL (ref 13.3–17.7)
IMM GRANULOCYTES # BLD: 0 10E3/UL
IMM GRANULOCYTES NFR BLD: 0 %
INR PPP: 0.99 (ref 0.85–1.15)
LYMPHOCYTES # BLD AUTO: 2.5 10E3/UL (ref 0.8–5.3)
LYMPHOCYTES NFR BLD AUTO: 24 %
MCH RBC QN AUTO: 27.2 PG (ref 26.5–33)
MCHC RBC AUTO-ENTMCNC: 33 G/DL (ref 31.5–36.5)
MCV RBC AUTO: 82 FL (ref 78–100)
MONOCYTES # BLD AUTO: 1.1 10E3/UL (ref 0–1.3)
MONOCYTES NFR BLD AUTO: 10 %
NEUTROPHILS # BLD AUTO: 6.5 10E3/UL (ref 1.6–8.3)
NEUTROPHILS NFR BLD AUTO: 62 %
NRBC # BLD AUTO: 0 10E3/UL
NRBC BLD AUTO-RTO: 0 /100
PLAT MORPH BLD: NORMAL
PLATELET # BLD AUTO: 356 10E3/UL (ref 150–450)
POTASSIUM BLD-SCNC: 4.2 MMOL/L (ref 3.5–5.1)
RBC # BLD AUTO: 5.41 10E6/UL (ref 4.4–5.9)
RBC MORPH BLD: NORMAL
SODIUM SERPL-SCNC: 139 MMOL/L (ref 134–144)
TROPONIN I SERPL-MCNC: 53 PG/ML (ref 0–34)
TROPONIN I SERPL-MCNC: 68.6 PG/ML (ref 0–34)
WBC # BLD AUTO: 10.4 10E3/UL (ref 4–11)

## 2022-05-16 PROCEDURE — 93010 ELECTROCARDIOGRAM REPORT: CPT | Performed by: INTERNAL MEDICINE

## 2022-05-16 PROCEDURE — 80048 BASIC METABOLIC PNL TOTAL CA: CPT | Performed by: FAMILY MEDICINE

## 2022-05-16 PROCEDURE — 250N000011 HC RX IP 250 OP 636: Performed by: FAMILY MEDICINE

## 2022-05-16 PROCEDURE — 93000 ELECTROCARDIOGRAM COMPLETE: CPT | Mod: 76 | Performed by: INTERNAL MEDICINE

## 2022-05-16 PROCEDURE — 93005 ELECTROCARDIOGRAM TRACING: CPT | Performed by: FAMILY MEDICINE

## 2022-05-16 PROCEDURE — 85004 AUTOMATED DIFF WBC COUNT: CPT | Performed by: FAMILY MEDICINE

## 2022-05-16 PROCEDURE — 36415 COLL VENOUS BLD VENIPUNCTURE: CPT | Performed by: FAMILY MEDICINE

## 2022-05-16 PROCEDURE — 71045 X-RAY EXAM CHEST 1 VIEW: CPT

## 2022-05-16 PROCEDURE — 85379 FIBRIN DEGRADATION QUANT: CPT | Performed by: FAMILY MEDICINE

## 2022-05-16 PROCEDURE — 99285 EMERGENCY DEPT VISIT HI MDM: CPT | Mod: 25 | Performed by: FAMILY MEDICINE

## 2022-05-16 PROCEDURE — 96374 THER/PROPH/DIAG INJ IV PUSH: CPT | Performed by: FAMILY MEDICINE

## 2022-05-16 PROCEDURE — 85610 PROTHROMBIN TIME: CPT | Performed by: FAMILY MEDICINE

## 2022-05-16 PROCEDURE — 84484 ASSAY OF TROPONIN QUANT: CPT | Performed by: FAMILY MEDICINE

## 2022-05-16 PROCEDURE — 99284 EMERGENCY DEPT VISIT MOD MDM: CPT | Performed by: FAMILY MEDICINE

## 2022-05-16 RX ORDER — ADENOSINE 3 MG/ML
12 INJECTION, SOLUTION INTRAVENOUS
Status: DISCONTINUED | OUTPATIENT
Start: 2022-05-16 | End: 2022-05-16 | Stop reason: HOSPADM

## 2022-05-16 RX ORDER — ADENOSINE 3 MG/ML
6 INJECTION, SOLUTION INTRAVENOUS ONCE
Status: COMPLETED | OUTPATIENT
Start: 2022-05-16 | End: 2022-05-16

## 2022-05-16 RX ORDER — FAMOTIDINE 20 MG/1
20 TABLET, FILM COATED ORAL DAILY PRN
COMMUNITY
End: 2022-09-13

## 2022-05-16 RX ADMIN — ADENOSINE 6 MG: 3 INJECTION, SOLUTION INTRAVENOUS at 11:09

## 2022-05-16 ASSESSMENT — ENCOUNTER SYMPTOMS
SHORTNESS OF BREATH: 0
PALPITATIONS: 1

## 2022-05-16 ASSESSMENT — PAIN SCALES - GENERAL: PAINLEVEL: NO PAIN (0)

## 2022-05-16 NOTE — TELEPHONE ENCOUNTER
Patient states his pulse has been running high for the last 14 hours.  He states he last seen MYRANDA South CNP around 2 years ago and she had told him that the next time he had an episode to come to the clinic and we would work him in.  Per MYRANDA South CNP patient should come to clinic for an EKG and vitals or may go to the ED.  Patient notified of this and is already on his way in.  He would like to be seen in clinic first but knows MYRANDA South CNP may send him to the ED based on findings.  Norma Garcia RN......May 16, 2022...9:43 AM

## 2022-05-16 NOTE — NURSING NOTE
Patient went to ED, was brought by another nurse earlier.  Norma Garcia RN......May 16, 2022...12:08 PM

## 2022-05-16 NOTE — TELEPHONE ENCOUNTER
[facility]-patient pulse is 170 he was told to come in when this is happening he would like a call on this     Please call and advise    Thank You    Dilcia Croft on 5/16/2022 at 8:33 AM

## 2022-05-16 NOTE — ED TRIAGE NOTES
Pt here with wife with c/o a rapid heart rate that started 15 hours ago, pt was seen in the clinic, pt denies any complaints, but sent over to ER with an EKG already done showing SVT vs aflutter, pt into bay [facility]     Triage Assessment     Row Name 05/16/22 1049       Triage Assessment (Adult)    Airway WDL WDL       Respiratory WDL    Respiratory WDL WDL       Skin Circulation/Temperature WDL    Skin Circulation/Temperature WDL WDL       Cardiac WDL    Cardiac WDL WDL       Peripheral/Neurovascular WDL    Peripheral Neurovascular WDL WDL       Cognitive/Neuro/Behavioral WDL    Cognitive/Neuro/Behavioral WDL WDL

## 2022-05-16 NOTE — ED PROVIDER NOTES
History     Chief Complaint   Patient presents with     Tachycardia     SVT in clinic       The history is provided by the patient, the spouse and medical records.     Howard Chow is a 61 year old male here from Cardiology clinic. He was seen in clinic with about 14 hours of rapid heart rate. He was stable with otherwise normal VS in clinic. They did have EP look at the EKG and they feel that this is SVT vs atrial flutter given the rate and even rhythm. They recommended ED care with adenosine +/- cardioversion.  Here in the ED he can feel his heart beating fast but has no chest pain, no shortness of breath.     He has had this before in [place] and in [place] but no one has been able to catch it on EKG.  He was symptomatic at the time of arrival and had return to NSR prior to EKG apparently. No history of PE, DVT or blood clot per patient and his wife.     Allergies:  No Known Allergies    Problem List:    Patient Active Problem List    Diagnosis Date Noted     H/O colonoscopy nl 2017 07/22/2019     Priority: Medium     Benign prostatic hyperplasia with urinary frequency 07/22/2019     Priority: Medium     Benign non-nodular prostatic hyperplasia with lower urinary tract symptoms 09/19/2016     Priority: Medium     Diverticulosis of colon 02/23/2015     Priority: Medium     Health care maintenance 02/18/2015     Priority: Medium        Past Medical History:    Past Medical History:   Diagnosis Date     Other chest pain        Past Surgical History:    Past Surgical History:   Procedure Laterality Date     ARTHROSCOPY SHOULDER      bilat     COLONOSCOPY  02/23/2015    Normal, F/U 2025     OTHER SURGICAL HISTORY      2/2007,,HERNIA REPAIR,Left,Repair of left inguinal hernia with mesh carried out February 2007       Family History:    Family History   Problem Relation Age of Onset     Genetic Disorder Other         Genetic,None noted       Social History:  Marital Status:   [2]  Social History  "    Tobacco Use     Smoking status: Never Smoker     Smokeless tobacco: Never Used   Vaping Use     Vaping Use: Never used   Substance Use Topics     Alcohol use: Yes     Alcohol/week: 1.0 standard drink     Comment: 1 beer a month on average     Drug use: Never     Comment: Drug use: No        Medications:    aspirin 81 MG EC tablet  atorvastatin (LIPITOR) 20 MG tablet  famotidine (PEPCID) 20 MG tablet          Review of Systems   Respiratory: Negative for shortness of breath.    Cardiovascular: Positive for palpitations. Negative for chest pain.   All other systems reviewed and are negative.      Physical Exam   BP: 110/86  Pulse: (!) 170  Temp: 97.1  F (36.2  C)  Resp: 18  Height: 180.3 cm (5' 11\")  Weight: 80.3 kg (177 lb)  SpO2: 94 %      Physical Exam  Vitals and nursing note reviewed.   Constitutional:       General: He is not in acute distress.     Appearance: Normal appearance. He is normal weight. He is not ill-appearing.   HENT:      Head: Normocephalic and atraumatic.   Cardiovascular:      Rate and Rhythm: Regular rhythm. Tachycardia present.      Pulses: Normal pulses.      Heart sounds: Normal heart sounds. No murmur heard.  Pulmonary:      Effort: Pulmonary effort is normal. No respiratory distress.      Breath sounds: Normal breath sounds.   Abdominal:      General: Bowel sounds are normal.      Palpations: Abdomen is soft.      Tenderness: There is no abdominal tenderness.   Musculoskeletal:         General: No swelling or tenderness.      Right lower leg: No edema.      Left lower leg: No edema.   Skin:     General: Skin is warm and dry.   Neurological:      General: No focal deficit present.      Mental Status: He is alert and oriented to person, place, and time.   Psychiatric:         Mood and Affect: Mood normal.         Behavior: Behavior normal.           1st EKG Interpretation:      Interpreted by Pk Garcia MD  Time reviewed: 10:15 AM  Symptoms at time of EKG: None   Rhythm: SVT " v wide complex tachycardia  Rate: >160  Axis: Normal  Ectopy: none  Conduction: right bundle branch block (complete)  ST Segments/ T Waves: No ST-T wave changes and No acute ischemic changes  Q Waves: none  Comparison to prior: No old EKG available    Clinical Impression: SVT vs atrial flutter with RVR         2nd  EKG Interpretation:      Interpreted by Pk Garcia MD  Time reviewed: 11:23 AM  Symptoms at time of EKG: None   Rhythm:NSR  Rate: 89  Axis: Normal  Ectopy: none  Conduction: right bundle branch block (complete)  ST Segments/ T Waves: No ST-T wave changes and No acute ischemic changes  Q Waves: none  Comparison to prior: return of NSR     Clinical Impression: NSR      Critical Care time:  was 30 minutes for this patient excluding procedures.      Results for orders placed or performed during the hospital encounter of 05/16/22 (from the past 24 hour(s))   CBC with platelets differential    Narrative    The following orders were created for panel order CBC with platelets differential.  Procedure                               Abnormality         Status                     ---------                               -----------         ------                     CBC with platelets and d...[[id number]]                      Final result               RBC and Platelet Morphology[[id number]]                      Final result                 Please view results for these tests on the individual orders.   D dimer quantitative   Result Value Ref Range    D-Dimer Quantitative 0.62 (H) 0.00 - 0.50 ug/mL FEU    Narrative    This D-dimer assay is intended for use in conjunction with a clinical pretest probability assessment model to exclude pulmonary embolism (PE) and deep venous thrombosis (DVT) in outpatients suspected of PE or DVT. The cut-off value is 0.50 ug/mL FEU.   INR   Result Value Ref Range    INR 0.99 0.85 - 1.15   Basic metabolic panel   Result Value Ref Range    Sodium 139 134 - 144 mmol/L    Potassium  4.2 3.5 - 5.1 mmol/L    Chloride 107 98 - 107 mmol/L    Carbon Dioxide (CO2) 24 21 - 31 mmol/L    Anion Gap 8 3 - 14 mmol/L    Urea Nitrogen 15 7 - 25 mg/dL    Creatinine 0.94 0.70 - 1.30 mg/dL    Calcium 9.4 8.6 - 10.3 mg/dL    Glucose 111 (H) 70 - 105 mg/dL    GFR Estimate >90 >60 mL/min/1.73m2   Troponin I   Result Value Ref Range    Troponin I 53.0 (H) 0.0 - 34.0 pg/mL   CBC with platelets and differential   Result Value Ref Range    WBC Count 10.4 4.0 - 11.0 10e3/uL    RBC Count 5.41 4.40 - 5.90 10e6/uL    Hemoglobin 14.7 13.3 - 17.7 g/dL    Hematocrit 44.6 40.0 - 53.0 %    MCV 82 78 - 100 fL    MCH 27.2 26.5 - 33.0 pg    MCHC 33.0 31.5 - 36.5 g/dL    RDW 13.0 10.0 - 15.0 %    Platelet Count 356 150 - 450 10e3/uL    % Neutrophils 62 %    % Lymphocytes 24 %    % Monocytes 10 %    % Eosinophils 3 %    % Basophils 1 %    % Immature Granulocytes 0 %    NRBCs per 100 WBC 0 <1 /100    Absolute Neutrophils 6.5 1.6 - 8.3 10e3/uL    Absolute Lymphocytes 2.5 0.8 - 5.3 10e3/uL    Absolute Monocytes 1.1 0.0 - 1.3 10e3/uL    Absolute Eosinophils 0.3 0.0 - 0.7 10e3/uL    Absolute Basophils 0.1 0.0 - 0.2 10e3/uL    Absolute Immature Granulocytes 0.0 <=0.4 10e3/uL    Absolute NRBCs 0.0 10e3/uL   RBC and Platelet Morphology   Result Value Ref Range    Platelet Assessment  Automated Count Confirmed. Platelet morphology is normal.     Automated Count Confirmed. Platelet morphology is normal.    RBC Morphology Confirmed RBC Indices    XR Chest Port 1 View    Narrative    PROCEDURE: XR CHEST PORT 1 VIEW 5/16/2022 11:26 AM    HISTORY: tachycardia    COMPARISONS: 4/8/2020.    TECHNIQUE: Single portable view.    FINDINGS: Heart is not enlarged. Lungs are clear and no pleural  effusion is seen.         Impression    IMPRESSION: No acute infiltrate.    TIARRA LOGAN MD         SYSTEM ID:  [id number]   Troponin I (now)   Result Value Ref Range    Troponin I 68.6 (H) 0.0 - 34.0 pg/mL       Medications   adenosine (ADENOCARD) injection  "12 mg (has no administration in time range)   adenosine (ADENOCARD) injection 6 mg (6 mg Intravenous Given 5/16/22 1109)       Assessments & Plan (with Medical Decision Making)  Howard Chow is a 61 year old male here from Cardiology clinic. He was seen in clinic with about 14 hours of rapid heart rate. He was stable with otherwise normal VS in clinic. They did have EP look at the EKG and they feel that this is SVT vs atrial flutter given the rate and even rhythm. They recommended ED care with adenosine +/- cardioversion.  Here in the ED he can feel his heart beating fast but has no chest pain, no shortness of breath.  He has had this before in [place] and in [place] but no one has been able to catch it on EKG.  He was symptomatic at the time of arrival and had return to NSR prior to EKG apparently. No history of PE, DVT or blood clot per patient and his wife.  Initial EKG from Cardiology Clinic shows SVT v atrial flutter with rate 171 bpm, per EP consult.  VS in the ED /86   Pulse (!) 170   Temp 97.1  F (36.2  C) (Tympanic)   Resp 18   Ht 1.803 m (5' 11\")   Wt 80.3 kg (177 lb)   SpO2 94%   BMI 24.69 kg/m    Exam shows a well appearing 60 yo male with rapid heart rate. He is medically stable.  We will plan modified Valsalva, adenosine and cardioversion if needed.  Labs are drawn.   10:57 AM  Attempted modified Valsalva with no change in rate.  11:12 AM  After 6 mg adenosine had a cardiac pause and return to NSR with rate in the 80's. He feels better.  Repeat EKG shows NSR with rate 82, RBBB. Labs show CBC normal, BMP with glucose 111, D dimer 0.62 (Comment on Age-Adjusted D Dimer testing: STRENGTH OF RECOMMENDATION A: Based on consistent and good quality patient-centered evidence from a meta-analysis of cohort studies. Jasvir NICHOLE, Jacquelin GJ, Lisa BRIZUELA, et al. Diagnostic accuracy of conventional or age adjusted D-dimer cut-off values in older patients with suspected venous thromboembolism: " systematic review and meta-analysis. BMJ. 2013;346:f2492. Physicians can easily apply an age-adjusted D-dimer cutoff as they interpret lab results by multiplying the patient s age in years × 10 mcg/L.  We would use age in years x 0.01 mcg/mL for our lab units.), INR 0.99, troponin 0.53. Chest xray stable.  2:03 PM  Repeat troponin 68.6. I spoke with cardiology and they feel that this is a normal variant for him given that he had tachycardia for 16 or 17 hours. They are going to try to see him before his next scheduled appointment in a week.  I did recommend that he return if he has any recurrence of symptoms.      I have reviewed the nursing notes.    I have reviewed the findings, diagnosis, plan and need for follow up with the patient.    Final diagnoses:   SVT (supraventricular tachycardia) (H)       5/16/2022   [facility] AND [facility], Pk Thomas MD  05/16/22 8688

## 2022-05-16 NOTE — NURSING NOTE
Patient states he has had this high heart rate for the last 14 hours.  EKG and vitals done.  Patient states when he thinks about it he can feel his heart but it is not tender or painful.  He does feel a smidge lightheaded.  MYRANDA South CNP notified of this right away.  Norma Garcia RN......May 16, 2022...10:15 AM

## 2022-05-17 LAB
ATRIAL RATE - MUSE: 89 BPM
DIASTOLIC BLOOD PRESSURE - MUSE: NORMAL MMHG
INTERPRETATION ECG - MUSE: NORMAL
P AXIS - MUSE: 51 DEGREES
PR INTERVAL - MUSE: 154 MS
QRS DURATION - MUSE: 128 MS
QT - MUSE: 410 MS
QTC - MUSE: 498 MS
R AXIS - MUSE: 88 DEGREES
SYSTOLIC BLOOD PRESSURE - MUSE: NORMAL MMHG
T AXIS - MUSE: 26 DEGREES
VENTRICULAR RATE- MUSE: 89 BPM

## 2022-05-19 LAB
ATRIAL RATE - MUSE: 174 BPM
DIASTOLIC BLOOD PRESSURE - MUSE: NORMAL MMHG
INTERPRETATION ECG - MUSE: NORMAL
P AXIS - MUSE: NORMAL DEGREES
PR INTERVAL - MUSE: NORMAL MS
QRS DURATION - MUSE: 120 MS
QT - MUSE: 272 MS
QTC - MUSE: 458 MS
R AXIS - MUSE: 98 DEGREES
SYSTOLIC BLOOD PRESSURE - MUSE: NORMAL MMHG
T AXIS - MUSE: -3 DEGREES
VENTRICULAR RATE- MUSE: 171 BPM

## 2022-05-23 ENCOUNTER — VIRTUAL VISIT (OUTPATIENT)
Dept: CARDIOLOGY | Facility: OTHER | Age: 62
End: 2022-05-23
Attending: INTERNAL MEDICINE
Payer: COMMERCIAL

## 2022-05-23 ENCOUNTER — VIRTUAL VISIT (OUTPATIENT)
Dept: CARDIOLOGY | Facility: CLINIC | Age: 62
End: 2022-05-23
Attending: INTERNAL MEDICINE
Payer: COMMERCIAL

## 2022-05-23 VITALS
SYSTOLIC BLOOD PRESSURE: 118 MMHG | HEART RATE: 68 BPM | OXYGEN SATURATION: 98 % | DIASTOLIC BLOOD PRESSURE: 64 MMHG | BODY MASS INDEX: 24.94 KG/M2 | RESPIRATION RATE: 16 BRPM | WEIGHT: 178.8 LBS | TEMPERATURE: 97.8 F

## 2022-05-23 VITALS
RESPIRATION RATE: 16 BRPM | HEIGHT: 70 IN | HEART RATE: 71 BPM | SYSTOLIC BLOOD PRESSURE: 118 MMHG | OXYGEN SATURATION: 98 % | BODY MASS INDEX: 25.6 KG/M2 | TEMPERATURE: 97.8 F | WEIGHT: 178.8 LBS | DIASTOLIC BLOOD PRESSURE: 64 MMHG

## 2022-05-23 DIAGNOSIS — I47.10 SUPRAVENTRICULAR TACHYCARDIA (H): Primary | ICD-10-CM

## 2022-05-23 DIAGNOSIS — I47.10 SVT (SUPRAVENTRICULAR TACHYCARDIA) (H): Primary | ICD-10-CM

## 2022-05-23 PROCEDURE — 99204 OFFICE O/P NEW MOD 45 MIN: CPT | Mod: 95 | Performed by: INTERNAL MEDICINE

## 2022-05-23 ASSESSMENT — PAIN SCALES - GENERAL
PAINLEVEL: NO PAIN (0)
PAINLEVEL: NO PAIN (0)

## 2022-05-23 NOTE — PROGRESS NOTES
ELECTROPHYSIOLOGY TELEMEDICINE CLINIC VISIT    Assessment/Recommendations   Assessment/Plan:    Mr. Chow is a 61 year old male who has a past medical history significant for HLD and palpitations newly diagnosed SVT.     Supraventricular Tachycardia:   He has been having palpitation episodes since 2015. He had not been able to catch it on ECG until recently he had a 14+ hour episode and ECG showed short RP tachycardia, likely AVNRT. We discussed various options for treatment of SVT. This is not a life threatening arrhythmia. Treatment is indicated primarily for relief of symptoms. Medications such as calcium channel blockers or beta blockers in some cases reduce the frequency and duration of the episodes but they will not cure it. The other option discussed was an electrophysiology study (EPS) and possible ablation. Success rate of ablation 80-90% depending on the mechanism. The procedural risk of EP study and ablation were discussed in detail. Risks discussed include: vascular complications, CVA, AVB, pericardial effusion and tamponade.He would like to pursue ablation as more curative approach. We will work to arrange this for him in near future.       Follow up 3 months after ablation via Telemedicine.       History of Present Illness/Subjective    Mr. Howard Chow is a 61 year old male who comes in today for EP consultation of SVT.    Mr. Chow is a 61 year old male who has a past medical history significant for HLD and palpitations.     He first started having shorter episodes of palpitations in 2015 lasting up to an hour, and now he has had episodes of prolonged palpitations starting in 2020. In 1/2020 he had an episode he reports HR up to 170 bpm for 24 hours. He had an additional episode about 2-3 weeks ago with -180's for 20 hours. He reports that he has had similar rapid palpitations in the past only lasting about one hour, this was initially noted in 2015 with normal EKG, no further  follow-up on this at that time.He saw Cardiology in 4/2020 reporting symptoms of tachycardia with associated chest heaviness, dyspnea, and lightheadedness. Labs including electrolytes, Hgb, and TSH WDL. ECG showed RBBB with no ST-T segment changes. A stress echo showed normal biventricular size/function, and no ischemia. Then on 5/16/22, he called reporting 14 hours of rapid heart rate and palpitations. ECG showed SVT and he was sent to ER. He was given adenosine which terminated the arrhyhtmia.  He has had this before in [place] and in [place] but no one has been able to catch it on ECG.     He reports feeling well today. He denies chest discomfort, palpitations, abdominal fullness/bloating or peripheral edema, shortness of breath, paroxysmal nocturnal dyspnea, orthopnea, lightheadedness, dizziness, pre-syncope, or syncope. Presenting 12 lead ECG shows SR Vent Rate 89 bpm,  ms,  ms, QTc 498 ms. Current cardiac medications include: Lipitor and ASA.         I have reviewed and updated the patient's Past Medical History, Social History, Family History and Medication List.     Cardiographics (Personally Reviewed) :   5/2020 Stress Echo:   Interpretation Summary   Normal, low-risk exercise echocardiogram.   The target heart rate was achieved. Normal heart rate and BP response to   exercise.   Normal biventricular size, thickness, and global systolic function at   baseline, LVEF=60-65%.   With exercise, LVEF increased to >65% and LV cavity size decreased   appropriately.   No regional wall motion abnormalities are present at rest or with exercise.   Exercise ECG portion of the test (including functional capacity) is reported   separately.   No significant valvular abnormalities are noted on screening Doppler exam.   The aortic root and visualized ascending aorta are normal.    ECG of SVT:         Physical Examination   /64 (BP Location: Right arm, Patient Position: Sitting, Cuff Size: Adult Regular)    "Pulse 71   Temp 97.8  F (36.6  C)   Resp 16   Ht 1.778 m (5' 10\")   Wt 81.1 kg (178 lb 12.8 oz)   SpO2 98%   BMI 25.66 kg/m    Wt Readings from Last 3 Encounters:   05/16/22 80.3 kg (177 lb)   05/16/22 80.6 kg (177 lb 9.6 oz)   08/24/21 82.5 kg (181 lb 12.8 oz)     Exam assisted by local RN:   General Appearance:   Alert, well-appearing and in no acute distress.   HEENT: Atraumatic, normocephalic. PERRL.  MMM.   Chest/Lungs:   Respirations unlabored.  Lungs are clear to auscultation.   Cardiovascular:   Regular rate and rhythm.  S1/S2. No murmur.    Abdomen:  Soft, nontender, nondistended.   Extremities: No cyanosis or clubbing. No edema.    Musculoskeletal: Moves all extremities.  Gait normal.   Skin: Warm, dry, intact.    Neurologic: Mood and affect are appropriate.  Alert and oriented to person, place, time, and situation.          Medications  Allergies   Current Outpatient Medications   Medication Sig Dispense Refill     aspirin 81 MG EC tablet Take 81 mg by mouth daily       atorvastatin (LIPITOR) 20 MG tablet Take 1 tablet (20 mg) by mouth daily 90 tablet 4     famotidine (PEPCID) 20 MG tablet Take 20 mg by mouth daily as needed      No Known Allergies      Lab Results (Personally Reviewed)    Chemistry/lipid CBC Cardiac Enzymes/BNP/TSH/INR   Lab Results   Component Value Date    BUN 15 05/16/2022     05/16/2022    CO2 24 05/16/2022     Creatinine   Date Value Ref Range Status   05/16/2022 0.94 0.70 - 1.30 mg/dL Final   04/08/2020 0.96 0.70 - 1.30 mg/dL Final       Lab Results   Component Value Date    CHOL 156 08/24/2021    HDL 31 08/24/2021    LDL 86 08/24/2021      Lab Results   Component Value Date    WBC 10.4 05/16/2022    HGB 14.7 05/16/2022    HCT 44.6 05/16/2022    MCV 82 05/16/2022     05/16/2022    Lab Results   Component Value Date    INR 0.99 05/16/2022        The patient states understanding and is agreeable with the plan.   Дмитрий Astudillo MD [facility]  Cardiology - " Electrophysiology      Total time spent on patient visit, reviewing notes, imaging, labs, orders, and completing necessary documentation: 45 minutes.

## 2022-05-23 NOTE — NURSING NOTE
"Denies chest pain/pressure, shortness of breath, lightheadedness, increased fatigue, syncope or pre-syncope.    Pt did have c/o nausea last week when in the ED for SVT.    Lung sounds clear bilaterally    Chief Complaint   Patient presents with     Telemedicine consult     Prolonged SVT       Initial There were no vitals taken for this visit. Estimated body mass index is 24.69 kg/m  as calculated from the following:    Height as of 5/16/22: 1.803 m (5' 11\").    Weight as of 5/16/22: 80.3 kg (177 lb).  Medication Reconciliation: complete    Cintia Milian RN      "

## 2022-05-23 NOTE — LETTER
May 24, 2022      TO: Howard Chow  [address]  [place] [address]         Dear . Howard Chow,    You are scheduled for a Supraventricular Tachycardia (SVT) Ablation, at [facility], [place], with Dr. Дмитрий Astudillo. The hospital is located at [address] [place] [address] on the [place] of the [place].  If you need to cancel this procedure, please call [phone].       You will need to undergo a COVID-19 PCR swab test within 4 days of procedure. You will receive a phone call with more information. If you do not hear from the COVID scheduling team, please call: [phone] to schedule.    Visitor Policy: One visitor.    Date:______  Time: _______________To the [facility] Waiting Room at the [facility]  SVT Ablation    1. Your history and physical will be completed by our nurse practitioner when you arrive.  2. Please do not eat anything for 8 hours prior to your procedure. You may have sips of water up until 2 hours prior to your arrival.  3. If you are diabetic follow the following instructions: (Contact your diabetes team if you have questions)   * Hold oral diabetic medications and short-acting insulins (aspart, lispro, regular) the morning of the procedure.              * Hold non-insulin injectable liraglutide (Victoza) the evening before and/or morning of the procedure.   * Hold mixed insulins (70/30, 75/25, 50/50) the morning of procedure. Instead, take 66% of NPH (N) component.   * Take 80% of your normal long-acting insulin (glargine/Lantus or levemir/Detemir, degludec/Tresiba) the evening before and/or morning of the procedure.  4. The morning of your procedure, you may take your scheduled medications with a sip of water .     5. You may discharge the same day. You will need a .        Post-Procedure Instructions  Care of groin site:    Remove the Band-Aid after 24 hours. If there is minor oozing, apply another Band-aid and remove it after 12 hours.      Do NOT take a bath, use a hot tub, pool, or submerse in water for at least 3 days. You may shower.     It is normal to have a small bruise or lump at the site.    Do not scrub the site.    Do not use lotion or powder near the puncture site for 3 days.    If you start bleeding from the site in your groin: Lie down flat and press firmly on the site. Call your physician immediately, or, come to the emergency room.  Call 911 right away if you have bleeding that is heavy or does not stop.    Call your doctor/provider if:     You have a large or growing hard lump around the site.     The site is red, swollen, hot or tender.     Blood or fluid is draining from the site.     You have chills or a fever greater than 101 F (38 C).     Your leg or arm turns bluish, feels numb or cool.     You have hives, a rash or unusual itching.      Activity Restrictions    For the first 2 days: Do not stoop or squat. When you cough, sneeze or move your bowels, hold your hand over the puncture site and press gently.    Do not lift more than 10 pounds or exertional activity for 10 days.  - No driving for 24 hours after (with or without general anesthesia).         Date: _______ Follow up appointment via Telemedicine to be arranged 3 months after      Please do not hesitate to utilize [employer] or call us if you have any questions or concerns.    Maryam TSAI Procedure   [phone]

## 2022-05-23 NOTE — NURSING NOTE
"Denies chest pain/pressure, shortness of breath, lightheadedness, increased fatigue, syncope or pre-syncope.    Pt did have c/o nausea last week when in the ED with SVT.    Lung sounds clear bilaterally    Chief Complaint   Patient presents with     Telemedicine consult     Prolonged SVT       Initial  Estimated body mass index is 24.69 kg/m  as calculated from the following:    Height as of 5/16/22: 1.803 m (5' 11\").    Weight as of 5/16/22: 80.3 kg (177).  Medication Reconciliation: complete    Cintia Milian RN  "

## 2022-05-23 NOTE — NURSING NOTE
After visit pt in agreement with ablation and aware that Dr. Astudillo's office to call to schedule appt.  Cintia Milian RN on 5/23/2022 at 9:43 AM

## 2022-05-23 NOTE — LETTER
5/23/2022    RE: Howard Chow  [address]  [place] [address]     Dear Colleague,    Thank you for the opportunity to participate in the care of your patient, Howard Chow, at the [facility] [place] at [facility]. Please see a copy of my visit note below.        ELECTROPHYSIOLOGY TELEMEDICINE CLINIC VISIT    Assessment/Recommendations   Assessment/Plan:    Mr. Chwo is a 61 year old male who has a past medical history significant for HLD and palpitations newly diagnosed SVT.     Supraventricular Tachycardia:   He has been having palpitation episodes since 2015. He had not been able to catch it on ECG until recently he had a 14+ hour episode and ECG showed short RP tachycardia, likely AVNRT. We discussed various options for treatment of SVT. This is not a life threatening arrhythmia. Treatment is indicated primarily for relief of symptoms. Medications such as calcium channel blockers or beta blockers in some cases reduce the frequency and duration of the episodes but they will not cure it. The other option discussed was an electrophysiology study (EPS) and possible ablation. Success rate of ablation 80-90% depending on the mechanism. The procedural risk of EP study and ablation were discussed in detail. Risks discussed include: vascular complications, CVA, AVB, pericardial effusion and tamponade.He would like to pursue ablation as more curative approach. We will work to arrange this for him in near future.       Follow up 3 months after ablation via Telemedicine.       History of Present Illness/Subjective    Mr. Howard Chow is a 61 year old male who comes in today for EP consultation of SVT.    Mr. Chow is a 61 year old male who has a past medical history significant for HLD and palpitations.     He first started having shorter episodes of palpitations in 2015 lasting up to an hour, and now he has had episodes of  prolonged palpitations starting in 2020. In 1/2020 he had an episode he reports HR up to 170 bpm for 24 hours. He had an additional episode about 2-3 weeks ago with -180's for 20 hours. He reports that he has had similar rapid palpitations in the past only lasting about one hour, this was initially noted in 2015 with normal EKG, no further follow-up on this at that time.He saw Cardiology in 4/2020 reporting symptoms of tachycardia with associated chest heaviness, dyspnea, and lightheadedness. Labs including electrolytes, Hgb, and TSH WDL. ECG showed RBBB with no ST-T segment changes. A stress echo showed normal biventricular size/function, and no ischemia. Then on 5/16/22, he called reporting 14 hours of rapid heart rate and palpitations. ECG showed SVT and he was sent to ER. He was given adenosine which terminated the arrhyhtmia.  He has had this before in [place] and in [place] but no one has been able to catch it on ECG.     He reports feeling well today. He denies chest discomfort, palpitations, abdominal fullness/bloating or peripheral edema, shortness of breath, paroxysmal nocturnal dyspnea, orthopnea, lightheadedness, dizziness, pre-syncope, or syncope. Presenting 12 lead ECG shows SR Vent Rate 89 bpm,  ms,  ms, QTc 498 ms. Current cardiac medications include: Lipitor and ASA.         I have reviewed and updated the patient's Past Medical History, Social History, Family History and Medication List.     Cardiographics (Personally Reviewed) :   5/2020 Stress Echo:   Interpretation Summary   Normal, low-risk exercise echocardiogram.   The target heart rate was achieved. Normal heart rate and BP response to   exercise.   Normal biventricular size, thickness, and global systolic function at   baseline, LVEF=60-65%.   With exercise, LVEF increased to >65% and LV cavity size decreased   appropriately.   No regional wall motion abnormalities are present at rest or with exercise.   Exercise ECG  "portion of the test (including functional capacity) is reported   separately.   No significant valvular abnormalities are noted on screening Doppler exam.   The aortic root and visualized ascending aorta are normal.    ECG of SVT:         Physical Examination   /64 (BP Location: Right arm, Patient Position: Sitting, Cuff Size: Adult Regular)   Pulse 71   Temp 97.8  F (36.6  C)   Resp 16   Ht 1.778 m (5' 10\")   Wt 81.1 kg (178 lb 12.8 oz)   SpO2 98%   BMI 25.66 kg/m    Wt Readings from Last 3 Encounters:   05/16/22 80.3 kg (177 lb)   05/16/22 80.6 kg (177 lb 9.6 oz)   08/24/21 82.5 kg (181 lb 12.8 oz)     Exam assisted by local RN:   General Appearance:   Alert, well-appearing and in no acute distress.   HEENT: Atraumatic, normocephalic. PERRL.  MMM.   Chest/Lungs:   Respirations unlabored.  Lungs are clear to auscultation.   Cardiovascular:   Regular rate and rhythm.  S1/S2. No murmur.    Abdomen:  Soft, nontender, nondistended.   Extremities: No cyanosis or clubbing. No edema.    Musculoskeletal: Moves all extremities.  Gait normal.   Skin: Warm, dry, intact.    Neurologic: Mood and affect are appropriate.  Alert and oriented to person, place, time, and situation.          Medications  Allergies   Current Outpatient Medications   Medication Sig Dispense Refill     aspirin 81 MG EC tablet Take 81 mg by mouth daily       atorvastatin (LIPITOR) 20 MG tablet Take 1 tablet (20 mg) by mouth daily 90 tablet 4     famotidine (PEPCID) 20 MG tablet Take 20 mg by mouth daily as needed      No Known Allergies      Lab Results (Personally Reviewed)    Chemistry/lipid CBC Cardiac Enzymes/BNP/TSH/INR   Lab Results   Component Value Date    BUN 15 05/16/2022     05/16/2022    CO2 24 05/16/2022     Creatinine   Date Value Ref Range Status   05/16/2022 0.94 0.70 - 1.30 mg/dL Final   04/08/2020 0.96 0.70 - 1.30 mg/dL Final       Lab Results   Component Value Date    CHOL 156 08/24/2021    HDL 31 08/24/2021    LDL " 86 08/24/2021      Lab Results   Component Value Date    WBC 10.4 05/16/2022    HGB 14.7 05/16/2022    HCT 44.6 05/16/2022    MCV 82 05/16/2022     05/16/2022    Lab Results   Component Value Date    INR 0.99 05/16/2022        The patient states understanding and is agreeable with the plan.     Дмитрий Astudillo MD [facility]  [facility] - Electrophysiology      Total time spent on patient visit, reviewing notes, imaging, labs, orders, and completing necessary documentation: 45 minutes.

## 2022-05-24 RX ORDER — LIDOCAINE 40 MG/G
CREAM TOPICAL
Status: CANCELLED | OUTPATIENT
Start: 2022-05-24

## 2022-05-24 RX ORDER — SODIUM CHLORIDE 9 MG/ML
INJECTION, SOLUTION INTRAVENOUS CONTINUOUS
Status: CANCELLED | OUTPATIENT
Start: 2022-05-24

## 2022-05-26 ENCOUNTER — TELEPHONE (OUTPATIENT)
Dept: CARDIOLOGY | Facility: OTHER | Age: 62
End: 2022-05-26
Payer: COMMERCIAL

## 2022-05-26 NOTE — TELEPHONE ENCOUNTER
Left message with pt to call back and ask for cardiology unit 3C, need to follow up with him in regards to his Ablasion procedure scheduled for 6/27 at 0615 at the [facility] along with ensuring that [facility] scheduling called him to set up his Covid test that needs to be complete on Friday 6/24 pre procedure.  Pt will also need a 3 month follow up with Wilda from 6/27 procedure.  Cintia Milian RN on 5/26/2022 at 3:21 PM

## 2022-06-21 NOTE — PROGRESS NOTES
EKG from clinic reviewed, SVT with rate 171 bpm, patient to be transferred to ED and agreeable to plan.   MYRANDA Cruz CNP

## 2022-06-24 ENCOUNTER — TELEPHONE (OUTPATIENT)
Dept: CARDIOLOGY | Facility: CLINIC | Age: 62
End: 2022-06-24

## 2022-06-24 NOTE — TELEPHONE ENCOUNTER
Call complete for pre procedure reminder and updated visitor policy.  Pt will complete covid test this weekend and take a picture of results.

## 2022-06-27 ENCOUNTER — APPOINTMENT (OUTPATIENT)
Dept: MEDSURG UNIT | Facility: CLINIC | Age: 62
End: 2022-06-27
Attending: INTERNAL MEDICINE
Payer: COMMERCIAL

## 2022-06-27 ENCOUNTER — APPOINTMENT (OUTPATIENT)
Dept: LAB | Facility: CLINIC | Age: 62
End: 2022-06-27
Attending: INTERNAL MEDICINE
Payer: COMMERCIAL

## 2022-06-27 ENCOUNTER — HOSPITAL ENCOUNTER (OUTPATIENT)
Facility: CLINIC | Age: 62
Discharge: HOME | End: 2022-06-27
Attending: INTERNAL MEDICINE | Admitting: INTERNAL MEDICINE
Payer: COMMERCIAL

## 2022-06-27 VITALS
BODY MASS INDEX: 25.48 KG/M2 | TEMPERATURE: 97.5 F | DIASTOLIC BLOOD PRESSURE: 79 MMHG | RESPIRATION RATE: 17 BRPM | SYSTOLIC BLOOD PRESSURE: 116 MMHG | HEART RATE: 67 BPM | HEIGHT: 70 IN | WEIGHT: 178 LBS | OXYGEN SATURATION: 96 %

## 2022-06-27 DIAGNOSIS — I47.10 SVT (SUPRAVENTRICULAR TACHYCARDIA) (H): ICD-10-CM

## 2022-06-27 LAB
ANION GAP SERPL CALCULATED.3IONS-SCNC: 7 MMOL/L (ref 7–15)
ATRIAL RATE - MUSE: 66 BPM
BUN SERPL-MCNC: 12.5 MG/DL (ref 8–23)
CALCIUM SERPL-MCNC: 9.7 MG/DL (ref 8.8–10.2)
CHLORIDE SERPL-SCNC: 104 MMOL/L (ref 98–107)
CREAT SERPL-MCNC: 0.72 MG/DL (ref 0.67–1.17)
DEPRECATED HCO3 PLAS-SCNC: 24 MMOL/L (ref 22–29)
DIASTOLIC BLOOD PRESSURE - MUSE: NORMAL MMHG
ERYTHROCYTE [DISTWIDTH] IN BLOOD BY AUTOMATED COUNT: 12.9 % (ref 10–15)
GFR SERPL CREATININE-BSD FRML MDRD: >90 ML/MIN/1.73M2
GLUCOSE SERPL-MCNC: 113 MG/DL (ref 70–99)
HCT VFR BLD AUTO: 45.9 % (ref 40–53)
HGB BLD-MCNC: 14.6 G/DL (ref 13.3–17.7)
INR PPP: 1.07 (ref 0.85–1.15)
INTERPRETATION ECG - MUSE: NORMAL
MCH RBC QN AUTO: 26.9 PG (ref 26.5–33)
MCHC RBC AUTO-ENTMCNC: 31.8 G/DL (ref 31.5–36.5)
MCV RBC AUTO: 85 FL (ref 78–100)
P AXIS - MUSE: 50 DEGREES
PLATELET # BLD AUTO: 261 10E3/UL (ref 150–450)
POTASSIUM SERPL-SCNC: 4.4 MMOL/L (ref 3.4–5.3)
PR INTERVAL - MUSE: 154 MS
QRS DURATION - MUSE: 138 MS
QT - MUSE: 448 MS
QTC - MUSE: 469 MS
R AXIS - MUSE: 81 DEGREES
RBC # BLD AUTO: 5.43 10E6/UL (ref 4.4–5.9)
SODIUM SERPL-SCNC: 135 MMOL/L (ref 136–145)
SYSTOLIC BLOOD PRESSURE - MUSE: NORMAL MMHG
T AXIS - MUSE: 36 DEGREES
VENTRICULAR RATE- MUSE: 66 BPM
WBC # BLD AUTO: 7.5 10E3/UL (ref 4–11)

## 2022-06-27 PROCEDURE — 250N000011 HC RX IP 250 OP 636: Performed by: INTERNAL MEDICINE

## 2022-06-27 PROCEDURE — 272N000001 HC OR GENERAL SUPPLY STERILE: Performed by: INTERNAL MEDICINE

## 2022-06-27 PROCEDURE — 93653 COMPRE EP EVAL TX SVT: CPT | Mod: GC | Performed by: INTERNAL MEDICINE

## 2022-06-27 PROCEDURE — 258N000003 HC RX IP 258 OP 636: Performed by: NURSE PRACTITIONER

## 2022-06-27 PROCEDURE — C1887 CATHETER, GUIDING: HCPCS | Performed by: INTERNAL MEDICINE

## 2022-06-27 PROCEDURE — 93623 PRGRMD STIMJ&PACG IV RX NFS: CPT | Mod: 26 | Performed by: INTERNAL MEDICINE

## 2022-06-27 PROCEDURE — C1732 CATH, EP, DIAG/ABL, 3D/VECT: HCPCS | Performed by: INTERNAL MEDICINE

## 2022-06-27 PROCEDURE — 85610 PROTHROMBIN TIME: CPT | Performed by: NURSE PRACTITIONER

## 2022-06-27 PROCEDURE — 999N000134 HC STATISTIC PP CARE STAGE 3

## 2022-06-27 PROCEDURE — 93010 ELECTROCARDIOGRAM REPORT: CPT | Mod: 76 | Performed by: INTERNAL MEDICINE

## 2022-06-27 PROCEDURE — C1733 CATH, EP, OTHR THAN COOL-TIP: HCPCS | Performed by: INTERNAL MEDICINE

## 2022-06-27 PROCEDURE — C1730 CATH, EP, 19 OR FEW ELECT: HCPCS | Performed by: INTERNAL MEDICINE

## 2022-06-27 PROCEDURE — 93653 COMPRE EP EVAL TX SVT: CPT | Performed by: INTERNAL MEDICINE

## 2022-06-27 PROCEDURE — 99153 MOD SED SAME PHYS/QHP EA: CPT | Performed by: INTERNAL MEDICINE

## 2022-06-27 PROCEDURE — 93623 PRGRMD STIMJ&PACG IV RX NFS: CPT | Performed by: INTERNAL MEDICINE

## 2022-06-27 PROCEDURE — 99152 MOD SED SAME PHYS/QHP 5/>YRS: CPT | Mod: GC | Performed by: INTERNAL MEDICINE

## 2022-06-27 PROCEDURE — 36415 COLL VENOUS BLD VENIPUNCTURE: CPT | Performed by: NURSE PRACTITIONER

## 2022-06-27 PROCEDURE — 82310 ASSAY OF CALCIUM: CPT | Performed by: NURSE PRACTITIONER

## 2022-06-27 PROCEDURE — 93005 ELECTROCARDIOGRAM TRACING: CPT

## 2022-06-27 PROCEDURE — 999N000054 HC STATISTIC EKG NON-CHARGEABLE

## 2022-06-27 PROCEDURE — 250N000009 HC RX 250: Performed by: INTERNAL MEDICINE

## 2022-06-27 PROCEDURE — C1894 INTRO/SHEATH, NON-LASER: HCPCS | Performed by: INTERNAL MEDICINE

## 2022-06-27 PROCEDURE — 99152 MOD SED SAME PHYS/QHP 5/>YRS: CPT | Performed by: INTERNAL MEDICINE

## 2022-06-27 PROCEDURE — 999N000142 HC STATISTIC PROCEDURE PREP ONLY

## 2022-06-27 PROCEDURE — 85027 COMPLETE CBC AUTOMATED: CPT | Performed by: NURSE PRACTITIONER

## 2022-06-27 RX ORDER — LIDOCAINE 40 MG/G
CREAM TOPICAL
Status: DISCONTINUED | OUTPATIENT
Start: 2022-06-27 | End: 2022-06-27 | Stop reason: HOSPADM

## 2022-06-27 RX ORDER — LIDOCAINE HYDROCHLORIDE 20 MG/ML
INJECTION, SOLUTION INFILTRATION; PERINEURAL
Status: DISCONTINUED | OUTPATIENT
Start: 2022-06-27 | End: 2022-06-27 | Stop reason: HOSPADM

## 2022-06-27 RX ORDER — FENTANYL CITRATE 50 UG/ML
INJECTION, SOLUTION INTRAMUSCULAR; INTRAVENOUS
Status: DISCONTINUED | OUTPATIENT
Start: 2022-06-27 | End: 2022-06-27 | Stop reason: HOSPADM

## 2022-06-27 RX ORDER — ACETAMINOPHEN 325 MG/1
650 TABLET ORAL EVERY 6 HOURS PRN
Status: DISCONTINUED | OUTPATIENT
Start: 2022-06-27 | End: 2022-06-27 | Stop reason: HOSPADM

## 2022-06-27 RX ORDER — SODIUM CHLORIDE 9 MG/ML
INJECTION, SOLUTION INTRAVENOUS CONTINUOUS
Status: DISCONTINUED | OUTPATIENT
Start: 2022-06-27 | End: 2022-06-27 | Stop reason: HOSPADM

## 2022-06-27 RX ADMIN — SODIUM CHLORIDE: 9 INJECTION, SOLUTION INTRAVENOUS at 07:23

## 2022-06-27 NOTE — H&P
Cardiac Electrophysiology H&P      Reason For Presentation: SVT ablation     HPI:    60 y/o gentleman here for a scheduled EPS and possible SVT ablation.    Hx significant for:  Palpitations  DLD    He has had no further episodes of palpitations following his ER visit during 5/22. Denies any CP, palpitations, SOB, orthopnea, PND, unexpected weight gain. No changes to his medications. Discussed the procedure in detail. Questions answered and informed consent obtained.    Past Medical History:      Past Medical History:   Diagnosis Date    Other chest pain     Hx of chest wall pain       Past Surgical  History:      Past Surgical History:   Procedure Laterality Date    ARTHROSCOPY SHOULDER      bilat    COLONOSCOPY  02/23/2015    Normal, F/U 2025    OTHER SURGICAL HISTORY      2/2007,,HERNIA REPAIR,Left,Repair of left inguinal hernia with mesh carried out February 2007       Family History:      Family History   Problem Relation Age of Onset    Genetic Disorder Other         Genetic,None noted       Social History:      Social History     Socioeconomic History    Marital status:      Spouse name: Not on file    Number of children: Not on file    Years of education: Not on file    Highest education level: Not on file   Occupational History    Not on file   Tobacco Use    Smoking status: Never Smoker    Smokeless tobacco: Never Used   Vaping Use    Vaping Use: Never used   Substance and Sexual Activity    Alcohol use: Yes     Alcohol/week: 1.0 standard drink     Comment: 1 beer a month on average    Drug use: Never     Comment: Drug use: No    Sexual activity: Yes     Partners: Female   Other Topics Concern    Not on file   Social History Narrative         3 step children  [employer]    Patient has never smoked.     Alcohol Use - yes little beer     Social Determinants of Health     Financial Resource Strain: Not on file   Food Insecurity: Not on file   Transportation Needs: Not on  "file   Physical Activity: Not on file   Stress: Not on file   Social Connections: Not on file   Intimate Partner Violence: Not on file   Housing Stability: Not on file       ROS:    A complete review of systems is negative except as noted above in the HPI.    Physical Exam:   Vitals: BP (!) 145/78 (BP Location: Right arm)   Pulse 64   Temp 97.6  F (36.4  C)   Resp 18   Ht 1.778 m (5' 10\")   Wt 80.7 kg (178 lb)   SpO2 100%   BMI 25.54 kg/m    Gen: NAD  Neck: No JVD  Chest: CTAB  Cardiovascular: RRR, normal S1/S2, no M/R/G   Abd: soft, NT/ND  Neuro: grossly intact  Skin: no LE edema         Relevant labs, imaging, medications and procedures were reviewed.    Assessment and Recommendations:   60 y/o gentleman here for a scheduled EPS and possible SVT ablation.    Hx significant for:  Palpitations  DLD    Has had intermittent palpitations sincew 2015. Was recently seen in the ER with an EKG that captured the SVT - appears short RP and likely AVNRT. Terminated with adenosine. On aspirin. Labs and stress test have been unremarkable. Will go ahead with EPS and likely SVT ablation.    I discussed the patient with Dr. Astudillo.    Dewey Farias MD   Cardiac electrophysiology fellow    "

## 2022-06-27 NOTE — PROGRESS NOTES
Patient tolerated recovery stage well. VSS, right groin site clean/dry/intact, no hematoma, and denies pain. Patient tolerated PO food and fluids. Teaching was done and discharge instructions were given. Patient ambulated, voided, and PIV was removed. Patient discharged from the hospital via wheelchair to home with wife and daughter.

## 2022-06-27 NOTE — PROGRESS NOTES
Patient roomed in bay 34. Arrived via stretcher accompanied by CCL RN off monitor. VSS. Rhythm upon arrival is SR on the room monitor. Denies pain and SOB. Reviewed activity and restrictions with patient and to notify RN of any symptom change. CCL access: right groin site C/D/I, no hematoma. Will continue to monitor status.

## 2022-06-27 NOTE — PROGRESS NOTES
2A prep for SVT ablation. VSS. Pt alert, oriented and aware of planned procedure. Appropriately NPO. Groin clipped. 20G PIV infusing. Pedal pulses marked, +1. EKG NSR, BBB. Consent signed.

## 2022-06-27 NOTE — DISCHARGE INSTRUCTIONS
Post-Ablation Discharge Instructions    After you go home:  Have an adult stay with you for 24 hours.  Drink plenty of fluids.  You may eat your normal diet, unless your doctor tells you otherwise.  For 24 hours:  Relax and take it easy.  Do NOT smoke.  Do NOT make any important or legal decisions.  Do NOT drive or operate machines at home or at work.  Do NOT drink alcohol.    Care of groin site:        Remove the Band-Aid after 24 hours. If there is minor oozing, apply another Band-aid and remove it after 12 hours.         Do NOT take a bath, use a hot tub, pool, or submerse in water for at least 3 days You may shower.         It is normal to have a small bruise or lump at the site.        Do not scrub the site.        Do not use lotion or powder near the puncture site for 3 days.        For the first 2 days: Do not stoop or squat. When you cough, sneeze or move your bowels, hold your hand over the puncture site and press gently.        Do not lift more than 10 pounds or exertional activity for 10 days.      If you start bleeding from the site in your groin:  Lie down flat and press firmly on the site.  Call your physician immediately, or, come to the emergency room.    Call 911 right away if you have bleeding that is heavy or does not stop.     Call your doctor/provider if:        You have a large or growing hard lump around the site.        The site is red, swollen, hot or tender.        Blood or fluid is draining from the site.        You have chills or a fever greater than 101 F (38 C).        Your leg or arm turns bluish, feels numb or cool.        You have hives, a rash or unusual itching.     Cardiovascular Clinic:   [address] [place] [address]  Your Care Team:  EP Cardiology   Telephone Number     Cintia Astudillo [phone]   Francesca May RN  [phone]     For scheduling appts or procedures:    Maryam Ling   [phone]   For the Device Clinic  (Pacemakers and ICD's)   RN's :   Denisse Doyle During business hours: [phone]     After business hours:   [phone]- select option 4 and ask for job code [id number].       As always, Thank you for trusting us with your health care needs

## 2022-06-28 LAB
ATRIAL RATE - MUSE: 72 BPM
DIASTOLIC BLOOD PRESSURE - MUSE: NORMAL MMHG
INTERPRETATION ECG - MUSE: NORMAL
P AXIS - MUSE: 36 DEGREES
PR INTERVAL - MUSE: 178 MS
QRS DURATION - MUSE: 130 MS
QT - MUSE: 412 MS
QTC - MUSE: 451 MS
R AXIS - MUSE: 81 DEGREES
SYSTOLIC BLOOD PRESSURE - MUSE: NORMAL MMHG
T AXIS - MUSE: 29 DEGREES
VENTRICULAR RATE- MUSE: 72 BPM

## 2022-08-29 DIAGNOSIS — E78.2 MIXED HYPERLIPIDEMIA: ICD-10-CM

## 2022-08-29 NOTE — LETTER
August 30, 2022      Howard Chow  [address] [facility]  [place] [address]                  This letter is to remind you that you are due for your annual exam with Howard Roberts. Your last comprehensive visit was more than 12 months ago.    Please call the clinic at [phone] to schedule your appointment.    If you should require additional refills before your scheduled appointment, please contact your pharmacy and we will refill your medication until the date of your appointment.    If you are no longer seeing Howard Roberts for primary care, please call to let us know. Doing so will remove you from our call/contact list.      Thank you for choosing [facility] and [facility] for your health care needs.    Sincerely,    Refill RN  [facility]

## 2022-08-29 NOTE — TELEPHONE ENCOUNTER
Patient called and said he has had so much blood work- and is leaving for [place] in one month-Please call

## 2022-08-30 NOTE — TELEPHONE ENCOUNTER
"[id number] [address] sent Rx request for the following:      Requested Prescriptions   Pending Prescriptions Disp Refills     atorvastatin (LIPITOR) 20 MG tablet [Pharmacy Med Name: Atorvastatin Calcium 20 MG Oral Tablet] 30 tablet 0     Sig: Take 1 tablet by mouth once daily       Statins Protocol Failed - 8/30/2022 12:38 PM        Failed - LDL on file in past 12 months     Recent Labs   Lab Test 08/24/21  0950   LDL 86             Failed - Recent (12 mo) or future (30 days) visit within the authorizing provider's specialty     Patient has had an office visit with the authorizing provider or a provider within the authorizing providers department within the previous 12 mos or has a future within next 30 days. See \"Patient Info\" tab in inbasket, or \"Choose Columns\" in Meds & Orders section of the refill encounter.                      Last Prescription Date:   08/24/2021  Last Fill Qty/Refills:         90, R-4  Last Office Visit:              08/24/2021  Future Office visit:           None    Patient is due for annual visit with PCP. A letter and Mychart message was sent.    Amanda Mckinney RN on 8/30/2022 at 1:13 PM      "

## 2022-08-31 RX ORDER — ATORVASTATIN CALCIUM 20 MG/1
TABLET, FILM COATED ORAL
Qty: 30 TABLET | Refills: 0 | Status: SHIPPED | OUTPATIENT
Start: 2022-08-31 | End: 2022-09-13

## 2022-08-31 NOTE — TELEPHONE ENCOUNTER
Future appointment with Howard Roberts MD on 9-.      Denise Rutherford LPN 8/31/2022 12:31 PM

## 2022-09-13 ENCOUNTER — VIRTUAL VISIT (OUTPATIENT)
Dept: CARDIOLOGY | Facility: CLINIC | Age: 62
End: 2022-09-13
Attending: INTERNAL MEDICINE
Payer: COMMERCIAL

## 2022-09-13 ENCOUNTER — OFFICE VISIT (OUTPATIENT)
Dept: FAMILY MEDICINE | Facility: OTHER | Age: 62
End: 2022-09-13
Attending: FAMILY MEDICINE
Payer: COMMERCIAL

## 2022-09-13 ENCOUNTER — VIRTUAL VISIT (OUTPATIENT)
Dept: CARDIOLOGY | Facility: OTHER | Age: 62
End: 2022-09-13
Attending: INTERNAL MEDICINE
Payer: COMMERCIAL

## 2022-09-13 VITALS
SYSTOLIC BLOOD PRESSURE: 122 MMHG | RESPIRATION RATE: 16 BRPM | OXYGEN SATURATION: 99 % | WEIGHT: 176.8 LBS | HEART RATE: 60 BPM | DIASTOLIC BLOOD PRESSURE: 66 MMHG | BODY MASS INDEX: 25.37 KG/M2 | TEMPERATURE: 97.4 F

## 2022-09-13 VITALS
HEIGHT: 70 IN | BODY MASS INDEX: 25.34 KG/M2 | RESPIRATION RATE: 20 BRPM | OXYGEN SATURATION: 98 % | TEMPERATURE: 97.2 F | DIASTOLIC BLOOD PRESSURE: 62 MMHG | HEART RATE: 78 BPM | SYSTOLIC BLOOD PRESSURE: 118 MMHG | WEIGHT: 177 LBS

## 2022-09-13 VITALS
BODY MASS INDEX: 25.37 KG/M2 | TEMPERATURE: 97.4 F | SYSTOLIC BLOOD PRESSURE: 122 MMHG | OXYGEN SATURATION: 99 % | DIASTOLIC BLOOD PRESSURE: 66 MMHG | WEIGHT: 176.8 LBS | RESPIRATION RATE: 16 BRPM | HEART RATE: 60 BPM

## 2022-09-13 DIAGNOSIS — E78.2 MIXED HYPERLIPIDEMIA: ICD-10-CM

## 2022-09-13 DIAGNOSIS — I47.10 SVT (SUPRAVENTRICULAR TACHYCARDIA) (H): Primary | ICD-10-CM

## 2022-09-13 DIAGNOSIS — K21.9 GASTROESOPHAGEAL REFLUX DISEASE WITHOUT ESOPHAGITIS: ICD-10-CM

## 2022-09-13 DIAGNOSIS — Z98.890 H/O CARDIAC RADIOFREQUENCY ABLATION: ICD-10-CM

## 2022-09-13 DIAGNOSIS — I47.9 PAROXYSMAL TACHYCARDIA (H): ICD-10-CM

## 2022-09-13 DIAGNOSIS — R00.2 PALPITATIONS: ICD-10-CM

## 2022-09-13 LAB
ATRIAL RATE - MUSE: 60 BPM
DIASTOLIC BLOOD PRESSURE - MUSE: NORMAL MMHG
INTERPRETATION ECG - MUSE: NORMAL
P AXIS - MUSE: 52 DEGREES
PR INTERVAL - MUSE: 158 MS
QRS DURATION - MUSE: 144 MS
QT - MUSE: 462 MS
QTC - MUSE: 462 MS
R AXIS - MUSE: 80 DEGREES
SYSTOLIC BLOOD PRESSURE - MUSE: NORMAL MMHG
T AXIS - MUSE: 41 DEGREES
VENTRICULAR RATE- MUSE: 60 BPM

## 2022-09-13 PROCEDURE — 93000 ELECTROCARDIOGRAM COMPLETE: CPT | Performed by: INTERNAL MEDICINE

## 2022-09-13 PROCEDURE — 99214 OFFICE O/P EST MOD 30 MIN: CPT | Mod: 95 | Performed by: INTERNAL MEDICINE

## 2022-09-13 PROCEDURE — 99213 OFFICE O/P EST LOW 20 MIN: CPT | Performed by: FAMILY MEDICINE

## 2022-09-13 RX ORDER — FAMOTIDINE 20 MG/1
20 TABLET, FILM COATED ORAL DAILY
Qty: 90 TABLET | Refills: 4 | Status: SHIPPED | OUTPATIENT
Start: 2022-09-13 | End: 2023-09-14

## 2022-09-13 RX ORDER — ATORVASTATIN CALCIUM 20 MG/1
20 TABLET, FILM COATED ORAL DAILY
Qty: 90 TABLET | Refills: 4 | Status: SHIPPED | OUTPATIENT
Start: 2022-09-13 | End: 2023-09-14

## 2022-09-13 ASSESSMENT — ENCOUNTER SYMPTOMS
DIZZINESS: 0
ARTHRALGIAS: 0
HEMATOCHEZIA: 0
PALPITATIONS: 0
ABDOMINAL PAIN: 0
HEARTBURN: 0
HEADACHES: 0
JOINT SWELLING: 0
MYALGIAS: 0
CONSTIPATION: 0
HEMATURIA: 0
SORE THROAT: 0
PARESTHESIAS: 0
DYSURIA: 0
COUGH: 0
NAUSEA: 0
SHORTNESS OF BREATH: 0
NERVOUS/ANXIOUS: 0
WEAKNESS: 0
EYE PAIN: 0
DIARRHEA: 0
FREQUENCY: 0
FEVER: 0
CHILLS: 0

## 2022-09-13 ASSESSMENT — PAIN SCALES - GENERAL
PAINLEVEL: NO PAIN (0)

## 2022-09-13 ASSESSMENT — PATIENT HEALTH QUESTIONNAIRE - PHQ9
SUM OF ALL RESPONSES TO PHQ QUESTIONS 1-9: 0
10. IF YOU CHECKED OFF ANY PROBLEMS, HOW DIFFICULT HAVE THESE PROBLEMS MADE IT FOR YOU TO DO YOUR WORK, TAKE CARE OF THINGS AT HOME, OR GET ALONG WITH OTHER PEOPLE: NOT DIFFICULT AT ALL
SUM OF ALL RESPONSES TO PHQ QUESTIONS 1-9: 0

## 2022-09-13 NOTE — NURSING NOTE
Patient here for physical and medication refill. Last eye exam August 2022 and last dental exam 2019. Medication Reconciliation: complete.    Leah Bejarano LPN  9/13/2022 2:45 PM

## 2022-09-13 NOTE — PROGRESS NOTES
"  Assessment & Plan     Mixed hyperlipidemia  Refill- atorvastatin (LIPITOR) 20 MG tablet; Take 1 tablet (20 mg) by mouth daily    Gastroesophageal reflux disease without esophagitis  Refill- famotidine (PEPCID) 20 MG tablet; Take 1 tablet (20 mg) by mouth daily    Paroxysmal tachycardia (H)  Updated  H/O cardiac radiofrequency ablation  Updated               BMI:   Estimated body mass index is 25.4 kg/m  as calculated from the following:    Height as of this encounter: 1.778 m (5' 10\").    Weight as of this encounter: 80.3 kg (177 lb).           No follow-ups on file.    Howard Roberts MD  [facility]    Subjective   Howard is a 61 year old, presenting for the following health issues:  Physical      Patient arrives here for medication refill.  Was scheduled for physical but he did is decided to skip physical.  Recently underwent a cardiac ablation and had a complete work-up prior to that.  He is tolerating his medications well.  Will be going to [place] here in the next few weeks    Healthy Habits:     Getting at least 3 servings of Calcium per day:  Yes    Bi-annual eye exam:  Yes    Dental care twice a year:  NO    Sleep apnea or symptoms of sleep apnea:  None    Diet:  Regular (no restrictions)    Frequency of exercise:  1 day/week    Duration of exercise:  15-30 minutes    Taking medications regularly:  Yes    Medication side effects:  None    PHQ-2 Total Score: 0    Additional concerns today:  No             Review of Systems         Objective    /62   Pulse 78   Temp 97.2  F (36.2  C)   Resp 20   Ht 1.778 m (5' 10\")   Wt 80.3 kg (177 lb)   SpO2 98%   BMI 25.40 kg/m    Body mass index is 25.4 kg/m .  Physical Exam  Constitutional:       Appearance: Normal appearance.   HENT:      Head: Normocephalic.   Cardiovascular:      Rate and Rhythm: Normal rate and regular rhythm.      Heart sounds: No murmur heard.  Pulmonary:      Effort: Pulmonary effort is normal.      Breath " sounds: Normal breath sounds.   Neurological:      Mental Status: He is alert.                            Answers for HPI/ROS submitted by the patient on 9/13/2022  If you checked off any problems, how difficult have these problems made it for you to do your work, take care of things at home, or get along with other people?: Not difficult at all  PHQ9 TOTAL SCORE: 0

## 2022-09-13 NOTE — NURSING NOTE
"Denies chest pain/pressure, shortness of breath, lightheadedness, nausea, increased fatigue, syncope or pre-syncope.    Lung sounds clear bilaterally.    Chief Complaint   Patient presents with     Telemedicine consult     3 mo. F/U ablation       Initial Pulse 60   Temp 97.4  F (36.3  C) (Temporal)   Resp 16   Wt 80.2 kg (176 lb 12.8 oz)   SpO2 99%   BMI 25.37 kg/m   Estimated body mass index is 25.37 kg/m  as calculated from the following:    Height as of 6/27/22: 1.778 m (5' 10\").    Weight as of this encounter: 80.2 kg (176 lb 12.8 oz).  Medication Reconciliation: complete    Norma Garcia RN  "

## 2022-09-13 NOTE — PROGRESS NOTES
ELECTROPHYSIOLOGY TELEMEDICINE CLINIC VISIT    Assessment/Recommendations   Assessment/Plan:    Mr. Chow is a 61 year old male who has a past medical history significant for HLD and palpitations, SVT s/p AVNRT ablation 6/27/22.   Supraventricular Tachycardia:   He has been having palpitation episodes since 2015. He had not been able to catch it on ECG until recently he had a 14+ hour episode and ECG showed short RP tachycardia, likely AVNRT. We discussed various options for treatment of SVT. He elected to pursue ablation which he had on 6/27/22. Doing well without recurrences. No further intervention required.     Follow up with EP on an as needed basis.        History of Present Illness/Subjective    Mr. Howard Chow is a 61 year old male who comes in today for EP consultation of SVT.    Mr. Chow is a 61 year old male who has a past medical history significant for HLD and palpitations, SVT s/p AVNRT ablation 6/27/22.     He first started having shorter episodes of palpitations in 2015 lasting up to an hour, and now he has had episodes of prolonged palpitations starting in 2020. In 1/2020 he had an episode he reports HR up to 170 bpm for 24 hours. He had an additional episode about 2-3 weeks ago with -180's for 20 hours. He reports that he has had similar rapid palpitations in the past only lasting about one hour, this was initially noted in 2015 with normal EKG, no further follow-up on this at that time.He saw Cardiology in 4/2020 reporting symptoms of tachycardia with associated chest heaviness, dyspnea, and lightheadedness. Labs including electrolytes, Hgb, and TSH WDL. ECG showed RBBB with no ST-T segment changes. A stress echo showed normal biventricular size/function, and no ischemia. Then on 5/16/22, he called reporting 14 hours of rapid heart rate and palpitations. ECG showed SVT and he was sent to ER. He was given adenosine which terminated the arrhyhtmia.  He has had this before in  [place] and in [place] but no one has been able to catch it on ECG.     EP Visit 5/23/22: He reports feeling well today. He denies chest discomfort, palpitations, abdominal fullness/bloating or peripheral edema, shortness of breath, paroxysmal nocturnal dyspnea, orthopnea, lightheadedness, dizziness, pre-syncope, or syncope. Presenting 12 lead ECG shows SR Vent Rate 89 bpm,  ms,  ms, QTc 498 ms. Current cardiac medications include: Lipitor and ASA.     He presents today for follow up. He had AVNRT ablation on 6/27/22. Groin sites well healed. He reports feeling well. He denies chest discomfort, palpitations, abdominal fullness/bloating or peripheral edema, shortness of breath, paroxysmal nocturnal dyspnea, orthopnea, lightheadedness, dizziness, pre-syncope, or syncope. Presenting 12 lead ECG shows SR RBBB Vent Rate 60 bpm,  ms,  ms, QTc 462 ms. Current cardiac medications include: Lipitor and ASA.     I have reviewed and updated the patient's Past Medical History, Social History, Family History and Medication List.     Cardiographics (Personally Reviewed) :   5/2020 Stress Echo:   Interpretation Summary   Normal, low-risk exercise echocardiogram.   The target heart rate was achieved. Normal heart rate and BP response to exercise.   Normal biventricular size, thickness, and global systolic function at baseline, LVEF=60-65%.   With exercise, LVEF increased to >65% and LV cavity size decreased appropriately.   No regional wall motion abnormalities are present at rest or with exercise.   Exercise ECG portion of the test (including functional capacity) is reported separately.   No significant valvular abnormalities are noted on screening Doppler exam.   The aortic root and visualized ascending aorta are normal.    ECG of SVT:         Physical Examination   /66 (BP Location: Right arm, Patient Position: Sitting, Cuff Size: Adult Regular)   Pulse 60   Temp 97.4  F (36.3  C) (Temporal)    Resp 16   Wt 80.2 kg (176 lb 12.8 oz)   SpO2 99%   BMI 25.37 kg/m    Wt Readings from Last 3 Encounters:   06/27/22 80.7 kg (178 lb)   05/23/22 81.1 kg (178 lb 12.8 oz)   05/23/22 81.1 kg (178 lb 12.8 oz)     Exam assisted by local RN:   General Appearance:   Alert, well-appearing and in no acute distress.   HEENT: Atraumatic, normocephalic. PERRL.  MMM.   Chest/Lungs:   Respirations unlabored.  Lungs are clear to auscultation.   Cardiovascular:   Regular rate and rhythm.  S1/S2. No murmur.    Abdomen:  Soft, nontender, nondistended.   Extremities: No cyanosis or clubbing. No edema.    Musculoskeletal: Moves all extremities.  Gait normal.   Skin: Warm, dry, intact.    Neurologic: Mood and affect are appropriate.  Alert and oriented to person, place, time, and situation.          Medications  Allergies   Current Outpatient Medications   Medication Sig Dispense Refill     aspirin 81 MG EC tablet Take 81 mg by mouth daily       atorvastatin (LIPITOR) 20 MG tablet Take 1 tablet by mouth once daily 30 tablet 0     famotidine (PEPCID) 20 MG tablet Take 20 mg by mouth daily as needed      No Known Allergies      Lab Results (Personally Reviewed)    Chemistry/lipid CBC Cardiac Enzymes/BNP/TSH/INR   Lab Results   Component Value Date    BUN 12.5 06/27/2022     (L) 06/27/2022    CO2 24 06/27/2022     Creatinine   Date Value Ref Range Status   06/27/2022 0.72 0.67 - 1.17 mg/dL Final   04/08/2020 0.96 0.70 - 1.30 mg/dL Final       Lab Results   Component Value Date    CHOL 156 08/24/2021    HDL 31 08/24/2021    LDL 86 08/24/2021      Lab Results   Component Value Date    WBC 7.5 06/27/2022    HGB 14.6 06/27/2022    HCT 45.9 06/27/2022    MCV 85 06/27/2022     06/27/2022    Lab Results   Component Value Date    INR 1.07 06/27/2022        The patient states understanding and is agreeable with the plan.   Дмитрий Astudillo MD [facility]  Cardiology - Electrophysiology      Total time spent on patient visit, reviewing  notes, imaging, labs, orders, and completing necessary documentation: 30 minutes.

## 2022-09-13 NOTE — LETTER
9/13/2022      RE: Howard Chow  [address]  [place] [address]       Dear Colleague,    Thank you for the opportunity to participate in the care of your patient, Howard Chow, at the [facility] [place] at [facility]. Please see a copy of my visit note below.        ELECTROPHYSIOLOGY TELEMEDICINE CLINIC VISIT    Assessment/Recommendations   Assessment/Plan:    Mr. Chow is a 61 year old male who has a past medical history significant for HLD and palpitations, SVT s/p AVNRT ablation 6/27/22.   Supraventricular Tachycardia:   He has been having palpitation episodes since 2015. He had not been able to catch it on ECG until recently he had a 14+ hour episode and ECG showed short RP tachycardia, likely AVNRT. We discussed various options for treatment of SVT. He elected to pursue ablation which he had on 6/27/22. Doing well without recurrences. No further intervention required.     Follow up with EP on an as needed basis.        History of Present Illness/Subjective    Mr. Howard Chow is a 61 year old male who comes in today for EP consultation of SVT.    Mr. Chow is a 61 year old male who has a past medical history significant for HLD and palpitations, SVT s/p AVNRT ablation 6/27/22.     He first started having shorter episodes of palpitations in 2015 lasting up to an hour, and now he has had episodes of prolonged palpitations starting in 2020. In 1/2020 he had an episode he reports HR up to 170 bpm for 24 hours. He had an additional episode about 2-3 weeks ago with -180's for 20 hours. He reports that he has had similar rapid palpitations in the past only lasting about one hour, this was initially noted in 2015 with normal EKG, no further follow-up on this at that time.He saw Cardiology in 4/2020 reporting symptoms of tachycardia with associated chest heaviness, dyspnea, and lightheadedness. Labs including  electrolytes, Hgb, and TSH WDL. ECG showed RBBB with no ST-T segment changes. A stress echo showed normal biventricular size/function, and no ischemia. Then on 5/16/22, he called reporting 14 hours of rapid heart rate and palpitations. ECG showed SVT and he was sent to ER. He was given adenosine which terminated the arrhyhtmia.  He has had this before in [place] and in [place] but no one has been able to catch it on ECG.     EP Visit 5/23/22: He reports feeling well today. He denies chest discomfort, palpitations, abdominal fullness/bloating or peripheral edema, shortness of breath, paroxysmal nocturnal dyspnea, orthopnea, lightheadedness, dizziness, pre-syncope, or syncope. Presenting 12 lead ECG shows SR Vent Rate 89 bpm,  ms,  ms, QTc 498 ms. Current cardiac medications include: Lipitor and ASA.     He presents today for follow up. He had AVNRT ablation on 6/27/22. Groin sites well healed. He reports feeling well. He denies chest discomfort, palpitations, abdominal fullness/bloating or peripheral edema, shortness of breath, paroxysmal nocturnal dyspnea, orthopnea, lightheadedness, dizziness, pre-syncope, or syncope. Presenting 12 lead ECG shows SR RBBB Vent Rate 60 bpm,  ms,  ms, QTc 462 ms. Current cardiac medications include: Lipitor and ASA.     I have reviewed and updated the patient's Past Medical History, Social History, Family History and Medication List.     Cardiographics (Personally Reviewed) :   5/2020 Stress Echo:   Interpretation Summary   Normal, low-risk exercise echocardiogram.   The target heart rate was achieved. Normal heart rate and BP response to exercise.   Normal biventricular size, thickness, and global systolic function at baseline, LVEF=60-65%.   With exercise, LVEF increased to >65% and LV cavity size decreased appropriately.   No regional wall motion abnormalities are present at rest or with exercise.   Exercise ECG portion of the test (including functional  capacity) is reported separately.   No significant valvular abnormalities are noted on screening Doppler exam.   The aortic root and visualized ascending aorta are normal.    ECG of SVT:         Physical Examination   /66 (BP Location: Right arm, Patient Position: Sitting, Cuff Size: Adult Regular)   Pulse 60   Temp 97.4  F (36.3  C) (Temporal)   Resp 16   Wt 80.2 kg (176 lb 12.8 oz)   SpO2 99%   BMI 25.37 kg/m    Wt Readings from Last 3 Encounters:   06/27/22 80.7 kg (178 lb)   05/23/22 81.1 kg (178 lb 12.8 oz)   05/23/22 81.1 kg (178 lb 12.8 oz)     Exam assisted by local RN:   General Appearance:   Alert, well-appearing and in no acute distress.   HEENT: Atraumatic, normocephalic. PERRL.  MMM.   Chest/Lungs:   Respirations unlabored.  Lungs are clear to auscultation.   Cardiovascular:   Regular rate and rhythm.  S1/S2. No murmur.    Abdomen:  Soft, nontender, nondistended.   Extremities: No cyanosis or clubbing. No edema.    Musculoskeletal: Moves all extremities.  Gait normal.   Skin: Warm, dry, intact.    Neurologic: Mood and affect are appropriate.  Alert and oriented to person, place, time, and situation.          Medications  Allergies   Current Outpatient Medications   Medication Sig Dispense Refill     aspirin 81 MG EC tablet Take 81 mg by mouth daily       atorvastatin (LIPITOR) 20 MG tablet Take 1 tablet by mouth once daily 30 tablet 0     famotidine (PEPCID) 20 MG tablet Take 20 mg by mouth daily as needed      No Known Allergies      Lab Results (Personally Reviewed)    Chemistry/lipid CBC Cardiac Enzymes/BNP/TSH/INR   Lab Results   Component Value Date    BUN 12.5 06/27/2022     (L) 06/27/2022    CO2 24 06/27/2022     Creatinine   Date Value Ref Range Status   06/27/2022 0.72 0.67 - 1.17 mg/dL Final   04/08/2020 0.96 0.70 - 1.30 mg/dL Final       Lab Results   Component Value Date    CHOL 156 08/24/2021    HDL 31 08/24/2021    LDL 86 08/24/2021      Lab Results   Component Value Date     WBC 7.5 06/27/2022    HGB 14.6 06/27/2022    HCT 45.9 06/27/2022    MCV 85 06/27/2022     06/27/2022    Lab Results   Component Value Date    INR 1.07 06/27/2022        The patient states understanding and is agreeable with the plan.   Дмитрий Astudillo MD [facility]  Cardiology - Electrophysiology      Total time spent on patient visit, reviewing notes, imaging, labs, orders, and completing necessary documentation: 30 minutes.

## 2022-09-13 NOTE — NURSING NOTE
Per Дмитрий Astudillo MD patient may follow up on an as needed basis.  Norma Garcia RN......September 13, 2022...8:51 AM

## 2022-09-17 ENCOUNTER — HEALTH MAINTENANCE LETTER (OUTPATIENT)
Age: 62
End: 2022-09-17

## 2023-01-23 ENCOUNTER — HEALTH MAINTENANCE LETTER (OUTPATIENT)
Age: 63
End: 2023-01-23

## 2023-09-11 ASSESSMENT — ENCOUNTER SYMPTOMS
CHILLS: 0
HEMATURIA: 0
MYALGIAS: 0
HEMATOCHEZIA: 0
CONSTIPATION: 0
HEARTBURN: 0
PALPITATIONS: 0
COUGH: 0
WEAKNESS: 0
DIZZINESS: 0
ABDOMINAL PAIN: 0
DIARRHEA: 0
ARTHRALGIAS: 0
FREQUENCY: 0
SHORTNESS OF BREATH: 0
SORE THROAT: 0
PARESTHESIAS: 0
NERVOUS/ANXIOUS: 0
HEADACHES: 0
NAUSEA: 0
JOINT SWELLING: 0
FEVER: 0
DYSURIA: 0
EYE PAIN: 0

## 2023-09-14 ENCOUNTER — OFFICE VISIT (OUTPATIENT)
Dept: FAMILY MEDICINE | Facility: OTHER | Age: 63
End: 2023-09-14
Attending: FAMILY MEDICINE
Payer: COMMERCIAL

## 2023-09-14 VITALS
TEMPERATURE: 97.3 F | BODY MASS INDEX: 24.39 KG/M2 | DIASTOLIC BLOOD PRESSURE: 64 MMHG | OXYGEN SATURATION: 98 % | HEIGHT: 71 IN | HEART RATE: 68 BPM | WEIGHT: 174.2 LBS | RESPIRATION RATE: 18 BRPM | SYSTOLIC BLOOD PRESSURE: 126 MMHG

## 2023-09-14 DIAGNOSIS — Z02.89 HEALTH EXAMINATION OF DEFINED SUBPOPULATION: Primary | ICD-10-CM

## 2023-09-14 DIAGNOSIS — E78.2 MIXED HYPERLIPIDEMIA: ICD-10-CM

## 2023-09-14 DIAGNOSIS — K21.9 GASTROESOPHAGEAL REFLUX DISEASE WITHOUT ESOPHAGITIS: ICD-10-CM

## 2023-09-14 DIAGNOSIS — Z12.5 SCREENING FOR PROSTATE CANCER: ICD-10-CM

## 2023-09-14 LAB
ALBUMIN UR-MCNC: 20 MG/DL
ANION GAP SERPL CALCULATED.3IONS-SCNC: 9 MMOL/L (ref 7–15)
APPEARANCE UR: CLEAR
BACTERIA #/AREA URNS HPF: ABNORMAL /HPF
BILIRUB UR QL STRIP: NEGATIVE
BUN SERPL-MCNC: 15.5 MG/DL (ref 8–23)
CALCIUM SERPL-MCNC: 9.4 MG/DL (ref 8.8–10.2)
CHLORIDE SERPL-SCNC: 103 MMOL/L (ref 98–107)
COLOR UR AUTO: YELLOW
CREAT SERPL-MCNC: 0.88 MG/DL (ref 0.67–1.17)
DEPRECATED HCO3 PLAS-SCNC: 26 MMOL/L (ref 22–29)
EGFRCR SERPLBLD CKD-EPI 2021: >90 ML/MIN/1.73M2
GLUCOSE SERPL-MCNC: 101 MG/DL (ref 70–99)
GLUCOSE UR STRIP-MCNC: NEGATIVE MG/DL
HGB UR QL STRIP: ABNORMAL
HOLD SPECIMEN: NORMAL
KETONES UR STRIP-MCNC: NEGATIVE MG/DL
LEUKOCYTE ESTERASE UR QL STRIP: NEGATIVE
MUCOUS THREADS #/AREA URNS LPF: PRESENT /LPF
NITRATE UR QL: NEGATIVE
PH UR STRIP: 5.5 [PH] (ref 5–9)
POTASSIUM SERPL-SCNC: 3.9 MMOL/L (ref 3.4–5.3)
PSA SERPL DL<=0.01 NG/ML-MCNC: 1.12 NG/ML (ref 0–4.5)
RBC URINE: 2 /HPF
SODIUM SERPL-SCNC: 138 MMOL/L (ref 136–145)
SP GR UR STRIP: 1.03 (ref 1–1.03)
UROBILINOGEN UR STRIP-MCNC: NORMAL MG/DL
WBC URINE: 1 /HPF

## 2023-09-14 PROCEDURE — 84153 ASSAY OF PSA TOTAL: CPT | Mod: ZL | Performed by: FAMILY MEDICINE

## 2023-09-14 PROCEDURE — 81003 URINALYSIS AUTO W/O SCOPE: CPT | Mod: ZL | Performed by: FAMILY MEDICINE

## 2023-09-14 PROCEDURE — 99396 PREV VISIT EST AGE 40-64: CPT | Performed by: FAMILY MEDICINE

## 2023-09-14 PROCEDURE — 80048 BASIC METABOLIC PNL TOTAL CA: CPT | Mod: ZL | Performed by: FAMILY MEDICINE

## 2023-09-14 PROCEDURE — G0103 PSA SCREENING: HCPCS | Mod: ZL | Performed by: FAMILY MEDICINE

## 2023-09-14 PROCEDURE — 36415 COLL VENOUS BLD VENIPUNCTURE: CPT | Mod: ZL | Performed by: FAMILY MEDICINE

## 2023-09-14 RX ORDER — FAMOTIDINE 20 MG/1
20 TABLET, FILM COATED ORAL DAILY
Qty: 90 TABLET | Refills: 4 | Status: SHIPPED | OUTPATIENT
Start: 2023-09-14 | End: 2024-09-16

## 2023-09-14 RX ORDER — ATORVASTATIN CALCIUM 20 MG/1
20 TABLET, FILM COATED ORAL DAILY
Qty: 90 TABLET | Refills: 4 | Status: SHIPPED | OUTPATIENT
Start: 2023-09-14 | End: 2024-09-16

## 2023-09-14 ASSESSMENT — ENCOUNTER SYMPTOMS
EYE PAIN: 0
WEAKNESS: 0
JOINT SWELLING: 0
SHORTNESS OF BREATH: 0
CHILLS: 0
FEVER: 0
NAUSEA: 0
ARTHRALGIAS: 0
HEARTBURN: 0
CONSTIPATION: 0
HEMATURIA: 0
COUGH: 0
PARESTHESIAS: 0
DIARRHEA: 0
MYALGIAS: 0
NERVOUS/ANXIOUS: 0
DIZZINESS: 0
HEMATOCHEZIA: 0
PALPITATIONS: 0
HEADACHES: 0
FREQUENCY: 0
ABDOMINAL PAIN: 0
DYSURIA: 0
SORE THROAT: 0

## 2023-09-14 ASSESSMENT — PAIN SCALES - GENERAL: PAINLEVEL: NO PAIN (0)

## 2023-09-14 NOTE — PROGRESS NOTES
"  SUBJECTIVE:   Howard Chow is a 62 year old male who presents to clinic today for the following health issues: Annual physical    Patient arrives here for an annual physical.  His only concern is a smelly urine.  States it started when he started taking the Pepcid.  Otherwise no other complaints.    Healthy Habits:     Getting at least 3 servings of Calcium per day:  Yes    Bi-annual eye exam:  Yes    Dental care twice a year:  NO    Sleep apnea or symptoms of sleep apnea:  Excessive snoring    Diet:  Regular (no restrictions)    Frequency of exercise:  1 day/week    Duration of exercise:  15-30 minutes    Taking medications regularly:  Yes    Medication side effects:  None    Additional concerns today:  No            Review of Systems   Constitutional:  Negative for chills and fever.   HENT:  Negative for congestion, ear pain, hearing loss and sore throat.    Eyes:  Negative for pain and visual disturbance.   Respiratory:  Negative for cough and shortness of breath.    Cardiovascular:  Negative for chest pain, palpitations and peripheral edema.   Gastrointestinal:  Negative for abdominal pain, constipation, diarrhea, heartburn, hematochezia and nausea.   Genitourinary:  Negative for dysuria, frequency, genital sores, hematuria, impotence, penile discharge and urgency.   Musculoskeletal:  Negative for arthralgias, joint swelling and myalgias.   Skin:  Negative for rash.   Neurological:  Negative for dizziness, weakness, headaches and paresthesias.   Psychiatric/Behavioral:  Negative for mood changes. The patient is not nervous/anxious.         OBJECTIVE:     /64   Pulse 68   Temp 97.3  F (36.3  C)   Resp 18   Ht 1.803 m (5' 11\")   Wt 79 kg (174 lb 3.2 oz)   SpO2 98%   BMI 24.30 kg/m    Body mass index is 24.3 kg/m .  Physical Exam  Constitutional:       Appearance: Normal appearance.   HENT:      Head: Normocephalic.      Mouth/Throat:      Mouth: Mucous membranes are dry.   Cardiovascular:      " Rate and Rhythm: Normal rate and regular rhythm.   Abdominal:      General: Abdomen is flat.   Musculoskeletal:         General: Normal range of motion.   Neurological:      Mental Status: He is alert.   Psychiatric:         Mood and Affect: Mood normal.         Thought Content: Thought content normal.             ASSESSMENT/PLAN:         (Z02.89) Health examination of defined subpopulation  (primary encounter diagnosis)  Comment: Satisfactory labs pending  Plan: Basic Metabolic Panel, UA reflex to Microscopic        Continue    (E78.2) Mixed hyperlipidemia  Comment:   Plan: atorvastatin (LIPITOR) 20 MG tablet            (K21.9) Gastroesophageal reflux disease without esophagitis  Comment:   Plan: famotidine (PEPCID) 20 MG tablet        Currently under good control with Pepcid.  We will check a UA    (Z12.5) Screening for prostate cancer  Comment:   Plan: PSA Screen GH              Howard Roberts MD  [facility]

## 2023-09-14 NOTE — NURSING NOTE
Patient here for yearly physical, last eye exam 2022 and last dental exam 2021. No concerns today.Medication Reconciliation: complete.    Leah Bejarano LPN  9/14/2023 7:53 AM

## 2024-09-16 ENCOUNTER — OFFICE VISIT (OUTPATIENT)
Dept: FAMILY MEDICINE | Facility: OTHER | Age: 64
End: 2024-09-16
Attending: FAMILY MEDICINE
Payer: COMMERCIAL

## 2024-09-16 VITALS
HEART RATE: 70 BPM | OXYGEN SATURATION: 97 % | WEIGHT: 172.6 LBS | RESPIRATION RATE: 20 BRPM | DIASTOLIC BLOOD PRESSURE: 68 MMHG | HEIGHT: 70 IN | TEMPERATURE: 97.9 F | BODY MASS INDEX: 24.71 KG/M2 | SYSTOLIC BLOOD PRESSURE: 124 MMHG

## 2024-09-16 DIAGNOSIS — E78.2 MIXED HYPERLIPIDEMIA: ICD-10-CM

## 2024-09-16 DIAGNOSIS — Z12.5 PROSTATE CANCER SCREENING: ICD-10-CM

## 2024-09-16 DIAGNOSIS — Z00.00 ENCOUNTER FOR MEDICARE ANNUAL WELLNESS EXAM: Primary | ICD-10-CM

## 2024-09-16 LAB
ALBUMIN SERPL BCG-MCNC: 4.2 G/DL (ref 3.5–5.2)
ALP SERPL-CCNC: 112 U/L (ref 40–150)
ALT SERPL W P-5'-P-CCNC: 13 U/L (ref 0–70)
ANION GAP SERPL CALCULATED.3IONS-SCNC: 8 MMOL/L (ref 7–15)
AST SERPL W P-5'-P-CCNC: 20 U/L (ref 0–45)
BILIRUB SERPL-MCNC: 0.5 MG/DL
BUN SERPL-MCNC: 13.2 MG/DL (ref 8–23)
CALCIUM SERPL-MCNC: 9.1 MG/DL (ref 8.8–10.4)
CHLORIDE SERPL-SCNC: 104 MMOL/L (ref 98–107)
CHOLEST SERPL-MCNC: 140 MG/DL
CREAT SERPL-MCNC: 0.9 MG/DL (ref 0.67–1.17)
EGFRCR SERPLBLD CKD-EPI 2021: >90 ML/MIN/1.73M2
FASTING STATUS PATIENT QL REPORTED: ABNORMAL
FASTING STATUS PATIENT QL REPORTED: NORMAL
GLUCOSE SERPL-MCNC: 92 MG/DL (ref 70–99)
HCO3 SERPL-SCNC: 26 MMOL/L (ref 22–29)
HDLC SERPL-MCNC: 31 MG/DL
LDLC SERPL CALC-MCNC: 84 MG/DL
NONHDLC SERPL-MCNC: 109 MG/DL
POTASSIUM SERPL-SCNC: 4.2 MMOL/L (ref 3.4–5.3)
PROT SERPL-MCNC: 7.6 G/DL (ref 6.4–8.3)
PSA SERPL DL<=0.01 NG/ML-MCNC: 1.15 NG/ML (ref 0–4.5)
SODIUM SERPL-SCNC: 138 MMOL/L (ref 135–145)
TRIGL SERPL-MCNC: 125 MG/DL

## 2024-09-16 PROCEDURE — 36415 COLL VENOUS BLD VENIPUNCTURE: CPT | Mod: ZL | Performed by: FAMILY MEDICINE

## 2024-09-16 PROCEDURE — 80053 COMPREHEN METABOLIC PANEL: CPT | Mod: ZL | Performed by: FAMILY MEDICINE

## 2024-09-16 PROCEDURE — G0103 PSA SCREENING: HCPCS | Mod: ZL | Performed by: FAMILY MEDICINE

## 2024-09-16 PROCEDURE — G0438 PPPS, INITIAL VISIT: HCPCS | Performed by: FAMILY MEDICINE

## 2024-09-16 PROCEDURE — 80061 LIPID PANEL: CPT | Mod: ZL | Performed by: FAMILY MEDICINE

## 2024-09-16 RX ORDER — ATORVASTATIN CALCIUM 20 MG/1
20 TABLET, FILM COATED ORAL DAILY
Qty: 90 TABLET | Refills: 4 | Status: SHIPPED | OUTPATIENT
Start: 2024-09-16

## 2024-09-16 ASSESSMENT — PAIN SCALES - GENERAL: PAINLEVEL: NO PAIN (0)

## 2024-09-16 NOTE — NURSING NOTE
Patient here for wellness visit, last eye exam 2023 and last dental this year. No concerns today. Medication Reconciliation: complete.    Leah Bejarano LPN  9/16/2024 11:14 AM

## 2024-09-16 NOTE — PROGRESS NOTES
Preventive Care Visit  [facility]  Howard Roberts MD, Family Medicine  Sep 16, 2024      Assessment & Plan     Mixed hyperlipidemia    - atorvastatin (LIPITOR) 20 MG tablet; Take 1 tablet (20 mg) by mouth daily.  - Lipid Panel; Future  - Lipid Panel    Encounter for Medicare annual wellness exam    - Lipid Panel; Future  - Comprehensive Metabolic Panel; Future  - Comprehensive Metabolic Panel  - Lipid Panel    Prostate cancer screening    - PSA Screen GH; Future  - PSA Screen GH      Results for orders placed or performed in visit on 09/16/24   Comprehensive Metabolic Panel     Status: None   Result Value Ref Range    Sodium 138 135 - 145 mmol/L    Potassium 4.2 3.4 - 5.3 mmol/L    Carbon Dioxide (CO2) 26 22 - 29 mmol/L    Anion Gap 8 7 - 15 mmol/L    Urea Nitrogen 13.2 8.0 - 23.0 mg/dL    Creatinine 0.90 0.67 - 1.17 mg/dL    GFR Estimate >90 >60 mL/min/1.73m2    Calcium 9.1 8.8 - 10.4 mg/dL    Chloride 104 98 - 107 mmol/L    Glucose 92 70 - 99 mg/dL    Alkaline Phosphatase 112 40 - 150 U/L    AST 20 0 - 45 U/L    ALT 13 0 - 70 U/L    Protein Total 7.6 6.4 - 8.3 g/dL    Albumin 4.2 3.5 - 5.2 g/dL    Bilirubin Total 0.5 <=1.2 mg/dL    Patient Fasting > 8hrs? Unknown    Lipid Panel     Status: Abnormal   Result Value Ref Range    Cholesterol 140 <200 mg/dL    Triglycerides 125 <150 mg/dL    Direct Measure HDL 31 (L) >=40 mg/dL    LDL Cholesterol Calculated 84 <100 mg/dL    Non HDL Cholesterol 109 <130 mg/dL    Patient Fasting > 8hrs? Unknown     Narrative    Cholesterol  Desirable: < 200 mg/dL  Borderline High: 200 - 239 mg/dL  High: >= 240 mg/dL    Triglycerides  Normal: < 150 mg/dL  Borderline High: 150 - 199 mg/dL  High: 200-499 mg/dL  Very High: >= 500 mg/dL    Direct Measure HDL  Female: >= 50 mg/dL   Male: >= 40 mg/dL    LDL Cholesterol  Desirable: < 100 mg/dL  Above Desirable: 100 - 129 mg/dL   Borderline High: 130 - 159 mg/dL   High:  160 - 189 mg/dL   Very High: >= 190 mg/dL    Non  HDL Cholesterol  Desirable: < 130 mg/dL  Above Desirable: 130 - 159 mg/dL  Borderline High: 160 - 189 mg/dL  High: 190 - 219 mg/dL  Very High: >= 220 mg/dL     Lab satisfactory      Counseling  Appropriate preventive services were addressed with this patient via screening, questionnaire, or discussion as appropriate for fall prevention, nutrition, physical activity, Tobacco-use cessation, social engagement, weight loss and cognition.  Checklist reviewing preventive services available has been given to the patient.  Reviewed patient's diet, addressing concerns and/or questions.   The patient was instructed to see the dentist every 6 months.           No follow-ups on file.    Sonia Lawrence is a 63 year old, presenting for the following:  Wellness Visit         Health Care Directive  Patient does not have a Health Care Directive or Living Will: Discussed advance care planning with patient; however, patient declined at this time.    HPI  Patient arrives for medication refill Medicare wellness.            9/13/2024   General Health   How would you rate your overall physical health? Good   Feel stress (tense, anxious, or unable to sleep) Not at all            9/13/2024   Nutrition   Diet: Regular (no restrictions)            9/13/2024   Exercise   Days per week of moderate/strenous exercise 4 days   Average minutes spent exercising at this level 30 min            9/13/2024   Social Factors   Frequency of gathering with friends or relatives More than three times a week   Worry food won't last until get money to buy more No   Food not last or not have enough money for food? No   Do you have housing? (Housing is defined as stable permanent housing and does not include staying ouside in a car, in a tent, in an abandoned building, in an overnight shelter, or couch-surfing.) Yes   Are you worried about losing your housing? No   Lack of transportation? No   Unable to get utilities (heat,electricity)? No             9/13/2024   Fall Risk   Fallen 2 or more times in the past year? No   Trouble with walking or balance? No             9/13/2024   Activities of Daily Living- Home Safety   Needs help with the following daily activites None of the above   Safety concerns in the home None of the above            9/13/2024   Dental   Dentist two times every year? (!) NO            9/13/2024   Hearing Screening   Hearing concerns? None of the above            9/13/2024   Driving Risk Screening   Patient/family members have concerns about driving No            9/13/2024   General Alertness/Fatigue Screening   Have you been more tired than usual lately? No            9/13/2024   Urinary Incontinence Screening   Bothered by leaking urine in past 6 months No            9/13/2024   TB Screening   Were you born outside of the [place]? No            Today's PHQ-2 Score:       9/16/2024    11:04 AM   PHQ-2 ( 1999 [employer])   Q1: Little interest or pleasure in doing things 0   Q2: Feeling down, depressed or hopeless 0   PHQ-2 Score 0   Q1: Little interest or pleasure in doing things Not at all   Q2: Feeling down, depressed or hopeless Not at all   PHQ-2 Score 0           9/13/2024   Substance Use   Alcohol more than 3/day or more than 7/wk No   Do you have a current opioid prescription? No   How severe/bad is pain from 1 to 10? 0/10 (No Pain)   Do you use any other substances recreationally? No        Social History     Tobacco Use    Smoking status: Never    Smokeless tobacco: Never   Vaping Use    Vaping status: Never Used   Substance Use Topics    Alcohol use: Yes     Alcohol/week: 1.0 standard drink of alcohol     Comment: 1 beer a month on average    Drug use: Never     Comment: Drug use: No       Last PSA:   Prostate Specific Antigen Screen   Date Value Ref Range Status   09/14/2023 1.12 0.00 - 4.50 ng/mL Final   08/24/2021 0.71 0.00 - 4.00 ug/L Final     ASCVD Risk   The 10-year ASCVD risk score (Nils HERNANDEZ, et al., 2019) is: 12.4%     "Values used to calculate the score:      Age: 63 years      Sex: Male      Is Non- : No      Diabetic: No      Tobacco smoker: No      Systolic Blood Pressure: 124 mmHg      Is BP treated: No      HDL Cholesterol: 31 mg/dL      Total Cholesterol: 156 mg/dL            Reviewed and updated as needed this visit by Provider                      Current providers sharing in care for this patient include:  Patient Care Team:  Howard Roberts MD as PCP - General (Family Practice)  Howard Roberts MD as Assigned PCP    The following health maintenance items are reviewed in Epic and correct as of today:  Health Maintenance   Topic Date Due    ADVANCE CARE PLANNING  Never done    ZOSTER IMMUNIZATION (1 of 2) Never done    RSV VACCINE (1 - Risk 60-74 years 1-dose series) Never done    LIPID  08/24/2022    INFLUENZA VACCINE (1) Never done    COVID-19 Vaccine (3 - 2024-25 season) 09/01/2024    MEDICARE ANNUAL WELLNESS VISIT  09/14/2024    DTAP/TDAP/TD IMMUNIZATION (2 - Td or Tdap) 01/16/2025    COLORECTAL CANCER SCREENING  02/23/2025    GLUCOSE  09/14/2026    PHQ-2 (once per calendar year)  Completed    Pneumococcal Vaccine: Pediatrics (0 to 5 Years) and At-Risk Patients (6 to 64 Years)  Aged Out    HPV IMMUNIZATION  Aged Out    MENINGITIS IMMUNIZATION  Aged Out    RSV MONOCLONAL ANTIBODY  Aged Out    HEPATITIS C SCREENING  Discontinued    HIV SCREENING  Discontinued            Objective    Exam  /68   Pulse 70   Temp 97.9  F (36.6  C)   Resp 20   Ht 1.784 m (5' 10.25\")   Wt 78.3 kg (172 lb 9.6 oz)   SpO2 97%   BMI 24.59 kg/m     Estimated body mass index is 24.59 kg/m  as calculated from the following:    Height as of this encounter: 1.784 m (5' 10.25\").    Weight as of this encounter: 78.3 kg (172 lb 9.6 oz).    Physical Exam  GENERAL: alert and no distress  NECK: no adenopathy, no asymmetry, masses, or scars  RESP: lungs clear to auscultation - no rales, rhonchi or wheezes  CV: regular " rate and rhythm, normal S1 S2, no S3 or S4, no murmur, click or rub, no peripheral edema  ABDOMEN: soft, nontender, no hepatosplenomegaly, no masses and bowel sounds normal  MS: no gross musculoskeletal defects noted, no edema        9/16/2024   Mini Cog   Clock Draw Score 2 Normal   3 Item Recall 2 objects recalled   Mini Cog Total Score 4            Vision Screen eye exam done 2023 optometry.  Dental exam done 2024 no concerns         Signed Electronically by: Howard Roberts MD

## (undated) DEVICE — REPRO BARD EP XT DECAPL STRBL DX EP CATH 6F

## (undated) DEVICE — SHEATH HEMO FAST CATH RAMP 406965

## (undated) DEVICE — PACK HEART LEFT CUSTOM

## (undated) DEVICE — INTRODUCER SHEATH FAST-CATH CATH-LOCK 7FRX12CM 406702

## (undated) DEVICE — INTRO SHEATH 4FRX10CM PINNACLE RSS402

## (undated) DEVICE — INTRODUCER SHEATH FAST-CATH CATH-LOCK 6FRX12CM 406701

## (undated) DEVICE — TUBE SET SMARKABLATE IRRIGATION

## (undated) DEVICE — INTRO SHEATH MICRO PLATINUM TIP 4FRX40CM 7274

## (undated) DEVICE — CATH THERMOCOOL SMARTTOUCH SF DF CURVE

## (undated) DEVICE — INTRODUCER SHEATH FAST-CATH 8FRX12CM 406112

## (undated) DEVICE — REPRO DAIG RSPN FX CRV DX EP CATH 6F

## (undated) DEVICE — CATH EP REPRO DAIG SPRM FX CRV DX EP C

## (undated) DEVICE — PATCH CARTO 3 EXTERNAL REFERENCE 3D MAPPING CREFP6

## (undated) RX ORDER — FENTANYL CITRATE 50 UG/ML
INJECTION, SOLUTION INTRAMUSCULAR; INTRAVENOUS
Status: DISPENSED
Start: 2022-06-27

## (undated) RX ORDER — BUPIVACAINE HYDROCHLORIDE 5 MG/ML
INJECTION, SOLUTION EPIDURAL; INTRACAUDAL
Status: DISPENSED
Start: 2022-06-27

## (undated) RX ORDER — LIDOCAINE HYDROCHLORIDE 20 MG/ML
INJECTION, SOLUTION INFILTRATION; PERINEURAL
Status: DISPENSED
Start: 2022-06-27

## (undated) RX ORDER — SODIUM CHLORIDE 9 MG/ML
INJECTION, SOLUTION INTRAVENOUS
Status: DISPENSED
Start: 2022-06-27

## (undated) RX ORDER — ADENOSINE 3 MG/ML
INJECTION, SOLUTION INTRAVENOUS
Status: DISPENSED
Start: 2022-05-16